# Patient Record
Sex: MALE | Race: WHITE | Employment: OTHER | URBAN - METROPOLITAN AREA
[De-identification: names, ages, dates, MRNs, and addresses within clinical notes are randomized per-mention and may not be internally consistent; named-entity substitution may affect disease eponyms.]

---

## 2017-12-01 ENCOUNTER — HOSPITAL ENCOUNTER (OUTPATIENT)
Dept: CT IMAGING | Age: 62
Discharge: HOME | End: 2017-12-01
Attending: INTERNAL MEDICINE
Payer: COMMERCIAL

## 2017-12-01 DIAGNOSIS — R10.32 LLQ PAIN: ICD-10-CM

## 2017-12-01 PROCEDURE — 74011636320 HC RX REV CODE- 636/320: Performed by: INTERNAL MEDICINE

## 2017-12-01 PROCEDURE — 74011000258 HC RX REV CODE- 258: Performed by: INTERNAL MEDICINE

## 2017-12-01 PROCEDURE — 74177 CT ABD & PELVIS W/CONTRAST: CPT

## 2017-12-01 RX ORDER — SODIUM CHLORIDE 0.9 % (FLUSH) 0.9 %
10 SYRINGE (ML) INJECTION
Status: COMPLETED | OUTPATIENT
Start: 2017-12-01 | End: 2017-12-01

## 2017-12-01 RX ADMIN — IOPAMIDOL 100 ML: 755 INJECTION, SOLUTION INTRAVENOUS at 09:09

## 2017-12-01 RX ADMIN — IOHEXOL 50 ML: 240 INJECTION, SOLUTION INTRATHECAL; INTRAVASCULAR; INTRAVENOUS; ORAL at 07:31

## 2017-12-01 RX ADMIN — Medication 10 ML: at 09:09

## 2017-12-01 RX ADMIN — SODIUM CHLORIDE 100 ML: 900 INJECTION, SOLUTION INTRAVENOUS at 09:09

## 2018-01-09 ENCOUNTER — HOSPITAL ENCOUNTER (INPATIENT)
Age: 63
LOS: 4 days | Discharge: HOME | DRG: 439 | End: 2018-01-13
Attending: EMERGENCY MEDICINE | Admitting: FAMILY MEDICINE
Payer: COMMERCIAL

## 2018-01-09 ENCOUNTER — APPOINTMENT (OUTPATIENT)
Dept: CT IMAGING | Age: 63
DRG: 439 | End: 2018-01-09
Attending: EMERGENCY MEDICINE
Payer: COMMERCIAL

## 2018-01-09 ENCOUNTER — APPOINTMENT (OUTPATIENT)
Dept: GENERAL RADIOLOGY | Age: 63
DRG: 439 | End: 2018-01-09
Attending: FAMILY MEDICINE
Payer: COMMERCIAL

## 2018-01-09 ENCOUNTER — APPOINTMENT (OUTPATIENT)
Dept: ULTRASOUND IMAGING | Age: 63
DRG: 439 | End: 2018-01-09
Attending: FAMILY MEDICINE
Payer: COMMERCIAL

## 2018-01-09 DIAGNOSIS — K85.80 OTHER ACUTE PANCREATITIS, UNSPECIFIED COMPLICATION STATUS: ICD-10-CM

## 2018-01-09 DIAGNOSIS — R10.13 ABDOMINAL PAIN, EPIGASTRIC: Primary | ICD-10-CM

## 2018-01-09 PROBLEM — K85.90 PANCREATITIS: Status: ACTIVE | Noted: 2018-01-09

## 2018-01-09 LAB
ALBUMIN SERPL-MCNC: 4.4 G/DL (ref 3.5–5)
ALBUMIN/GLOB SERPL: 1.1 {RATIO} (ref 1.1–2.2)
ALP SERPL-CCNC: 110 U/L (ref 45–117)
ALT SERPL-CCNC: 98 U/L (ref 12–78)
ANION GAP SERPL CALC-SCNC: 7 MMOL/L (ref 5–15)
AST SERPL-CCNC: 213 U/L (ref 15–37)
BASOPHILS # BLD: 0 K/UL (ref 0–0.1)
BASOPHILS NFR BLD: 0 % (ref 0–1)
BILIRUB SERPL-MCNC: 1.1 MG/DL (ref 0.2–1)
BUN SERPL-MCNC: 13 MG/DL (ref 6–20)
BUN/CREAT SERPL: 10 (ref 12–20)
CALCIUM SERPL-MCNC: 9.5 MG/DL (ref 8.5–10.1)
CHLORIDE SERPL-SCNC: 104 MMOL/L (ref 97–108)
CK SERPL-CCNC: 134 U/L (ref 39–308)
CO2 SERPL-SCNC: 29 MMOL/L (ref 21–32)
CREAT SERPL-MCNC: 1.25 MG/DL (ref 0.7–1.3)
DIFFERENTIAL METHOD BLD: ABNORMAL
EOSINOPHIL # BLD: 0 K/UL (ref 0–0.4)
EOSINOPHIL NFR BLD: 0 % (ref 0–7)
ERYTHROCYTE [DISTWIDTH] IN BLOOD BY AUTOMATED COUNT: 12.9 % (ref 11.5–14.5)
FLUAV AG NPH QL IA: NEGATIVE
FLUBV AG NOSE QL IA: NEGATIVE
GLOBULIN SER CALC-MCNC: 3.9 G/DL (ref 2–4)
GLUCOSE SERPL-MCNC: 116 MG/DL (ref 65–100)
HCT VFR BLD AUTO: 45.9 % (ref 36.6–50.3)
HGB BLD-MCNC: 15.6 G/DL (ref 12.1–17)
INR PPP: 1 (ref 0.9–1.1)
LACTATE SERPL-SCNC: 1.9 MMOL/L (ref 0.4–2)
LIPASE SERPL-CCNC: >3000 U/L (ref 73–393)
LYMPHOCYTES # BLD: 0.8 K/UL (ref 0.8–3.5)
LYMPHOCYTES NFR BLD: 7 % (ref 12–49)
MCH RBC QN AUTO: 31.2 PG (ref 26–34)
MCHC RBC AUTO-ENTMCNC: 34 G/DL (ref 30–36.5)
MCV RBC AUTO: 91.8 FL (ref 80–99)
MONOCYTES # BLD: 0.1 K/UL (ref 0–1)
MONOCYTES NFR BLD: 1 % (ref 5–13)
NEUTS SEG # BLD: 9.9 K/UL (ref 1.8–8)
NEUTS SEG NFR BLD: 92 % (ref 32–75)
PLATELET # BLD AUTO: 268 K/UL (ref 150–400)
POTASSIUM SERPL-SCNC: 4 MMOL/L (ref 3.5–5.1)
PROT SERPL-MCNC: 8.3 G/DL (ref 6.4–8.2)
PROTHROMBIN TIME: 10.6 SEC (ref 9–11.1)
RBC # BLD AUTO: 5 M/UL (ref 4.1–5.7)
RBC MORPH BLD: ABNORMAL
SODIUM SERPL-SCNC: 140 MMOL/L (ref 136–145)
WBC # BLD AUTO: 10.8 K/UL (ref 4.1–11.1)

## 2018-01-09 PROCEDURE — 74011000250 HC RX REV CODE- 250: Performed by: EMERGENCY MEDICINE

## 2018-01-09 PROCEDURE — 80053 COMPREHEN METABOLIC PANEL: CPT | Performed by: EMERGENCY MEDICINE

## 2018-01-09 PROCEDURE — 74011000258 HC RX REV CODE- 258: Performed by: EMERGENCY MEDICINE

## 2018-01-09 PROCEDURE — 76705 ECHO EXAM OF ABDOMEN: CPT

## 2018-01-09 PROCEDURE — 83605 ASSAY OF LACTIC ACID: CPT | Performed by: FAMILY MEDICINE

## 2018-01-09 PROCEDURE — 74011250636 HC RX REV CODE- 250/636: Performed by: FAMILY MEDICINE

## 2018-01-09 PROCEDURE — 87804 INFLUENZA ASSAY W/OPTIC: CPT | Performed by: EMERGENCY MEDICINE

## 2018-01-09 PROCEDURE — 85025 COMPLETE CBC W/AUTO DIFF WBC: CPT | Performed by: EMERGENCY MEDICINE

## 2018-01-09 PROCEDURE — 65660000000 HC RM CCU STEPDOWN

## 2018-01-09 PROCEDURE — 99283 EMERGENCY DEPT VISIT LOW MDM: CPT

## 2018-01-09 PROCEDURE — 96374 THER/PROPH/DIAG INJ IV PUSH: CPT

## 2018-01-09 PROCEDURE — 87186 SC STD MICRODIL/AGAR DIL: CPT | Performed by: FAMILY MEDICINE

## 2018-01-09 PROCEDURE — 96361 HYDRATE IV INFUSION ADD-ON: CPT

## 2018-01-09 PROCEDURE — 71045 X-RAY EXAM CHEST 1 VIEW: CPT

## 2018-01-09 PROCEDURE — 74011636320 HC RX REV CODE- 636/320: Performed by: EMERGENCY MEDICINE

## 2018-01-09 PROCEDURE — 36415 COLL VENOUS BLD VENIPUNCTURE: CPT | Performed by: EMERGENCY MEDICINE

## 2018-01-09 PROCEDURE — 83690 ASSAY OF LIPASE: CPT | Performed by: EMERGENCY MEDICINE

## 2018-01-09 PROCEDURE — 81001 URINALYSIS AUTO W/SCOPE: CPT | Performed by: FAMILY MEDICINE

## 2018-01-09 PROCEDURE — 82550 ASSAY OF CK (CPK): CPT | Performed by: FAMILY MEDICINE

## 2018-01-09 PROCEDURE — 87077 CULTURE AEROBIC IDENTIFY: CPT | Performed by: FAMILY MEDICINE

## 2018-01-09 PROCEDURE — 96375 TX/PRO/DX INJ NEW DRUG ADDON: CPT

## 2018-01-09 PROCEDURE — 85610 PROTHROMBIN TIME: CPT | Performed by: FAMILY MEDICINE

## 2018-01-09 PROCEDURE — 74011000250 HC RX REV CODE- 250: Performed by: FAMILY MEDICINE

## 2018-01-09 PROCEDURE — 74177 CT ABD & PELVIS W/CONTRAST: CPT

## 2018-01-09 PROCEDURE — 96376 TX/PRO/DX INJ SAME DRUG ADON: CPT

## 2018-01-09 PROCEDURE — 87040 BLOOD CULTURE FOR BACTERIA: CPT | Performed by: FAMILY MEDICINE

## 2018-01-09 PROCEDURE — 74011250636 HC RX REV CODE- 250/636: Performed by: EMERGENCY MEDICINE

## 2018-01-09 RX ORDER — SODIUM CHLORIDE 0.9 % (FLUSH) 0.9 %
10 SYRINGE (ML) INJECTION
Status: COMPLETED | OUTPATIENT
Start: 2018-01-09 | End: 2018-01-09

## 2018-01-09 RX ORDER — MORPHINE SULFATE 4 MG/ML
4 INJECTION, SOLUTION INTRAMUSCULAR; INTRAVENOUS
Status: COMPLETED | OUTPATIENT
Start: 2018-01-09 | End: 2018-01-09

## 2018-01-09 RX ORDER — HYDROMORPHONE HYDROCHLORIDE 2 MG/ML
1 INJECTION, SOLUTION INTRAMUSCULAR; INTRAVENOUS; SUBCUTANEOUS
Status: DISCONTINUED | OUTPATIENT
Start: 2018-01-09 | End: 2018-01-10

## 2018-01-09 RX ORDER — ONDANSETRON 2 MG/ML
4 INJECTION INTRAMUSCULAR; INTRAVENOUS
Status: DISCONTINUED | OUTPATIENT
Start: 2018-01-09 | End: 2018-01-13 | Stop reason: HOSPADM

## 2018-01-09 RX ORDER — SODIUM CHLORIDE 0.9 % (FLUSH) 0.9 %
5-10 SYRINGE (ML) INJECTION AS NEEDED
Status: DISCONTINUED | OUTPATIENT
Start: 2018-01-09 | End: 2018-01-13 | Stop reason: HOSPADM

## 2018-01-09 RX ORDER — SODIUM CHLORIDE 0.9 % (FLUSH) 0.9 %
5-10 SYRINGE (ML) INJECTION EVERY 8 HOURS
Status: DISCONTINUED | OUTPATIENT
Start: 2018-01-09 | End: 2018-01-13 | Stop reason: HOSPADM

## 2018-01-09 RX ORDER — MORPHINE SULFATE 2 MG/ML
4 INJECTION, SOLUTION INTRAMUSCULAR; INTRAVENOUS
Status: COMPLETED | OUTPATIENT
Start: 2018-01-09 | End: 2018-01-09

## 2018-01-09 RX ORDER — LEVOFLOXACIN 5 MG/ML
750 INJECTION, SOLUTION INTRAVENOUS EVERY 24 HOURS
Status: DISCONTINUED | OUTPATIENT
Start: 2018-01-09 | End: 2018-01-13 | Stop reason: HOSPADM

## 2018-01-09 RX ORDER — ONDANSETRON 2 MG/ML
4 INJECTION INTRAMUSCULAR; INTRAVENOUS
Status: COMPLETED | OUTPATIENT
Start: 2018-01-09 | End: 2018-01-09

## 2018-01-09 RX ORDER — FAMOTIDINE 10 MG/ML
20 INJECTION INTRAVENOUS
Status: COMPLETED | OUTPATIENT
Start: 2018-01-09 | End: 2018-01-09

## 2018-01-09 RX ORDER — PANTOPRAZOLE SODIUM 20 MG/1
TABLET, DELAYED RELEASE ORAL DAILY
COMMUNITY

## 2018-01-09 RX ORDER — METRONIDAZOLE 500 MG/100ML
500 INJECTION, SOLUTION INTRAVENOUS EVERY 6 HOURS
Status: DISCONTINUED | OUTPATIENT
Start: 2018-01-09 | End: 2018-01-11

## 2018-01-09 RX ORDER — SODIUM CHLORIDE AND POTASSIUM CHLORIDE .9; .15 G/100ML; G/100ML
SOLUTION INTRAVENOUS CONTINUOUS
Status: DISPENSED | OUTPATIENT
Start: 2018-01-09 | End: 2018-01-10

## 2018-01-09 RX ORDER — FAMOTIDINE 10 MG/1
TABLET ORAL 2 TIMES DAILY
COMMUNITY
End: 2018-01-23

## 2018-01-09 RX ORDER — HYDROMORPHONE HYDROCHLORIDE 2 MG/ML
2 INJECTION, SOLUTION INTRAMUSCULAR; INTRAVENOUS; SUBCUTANEOUS ONCE
Status: COMPLETED | OUTPATIENT
Start: 2018-01-09 | End: 2018-01-09

## 2018-01-09 RX ORDER — LORAZEPAM 2 MG/ML
1 INJECTION INTRAMUSCULAR
Status: DISCONTINUED | OUTPATIENT
Start: 2018-01-09 | End: 2018-01-13 | Stop reason: HOSPADM

## 2018-01-09 RX ORDER — SODIUM CHLORIDE, SODIUM LACTATE, POTASSIUM CHLORIDE, CALCIUM CHLORIDE 600; 310; 30; 20 MG/100ML; MG/100ML; MG/100ML; MG/100ML
200 INJECTION, SOLUTION INTRAVENOUS CONTINUOUS
Status: DISCONTINUED | OUTPATIENT
Start: 2018-01-09 | End: 2018-01-10

## 2018-01-09 RX ADMIN — ONDANSETRON 4 MG: 2 INJECTION INTRAMUSCULAR; INTRAVENOUS at 17:54

## 2018-01-09 RX ADMIN — MORPHINE SULFATE 4 MG: 2 INJECTION, SOLUTION INTRAMUSCULAR; INTRAVENOUS at 16:20

## 2018-01-09 RX ADMIN — MORPHINE SULFATE 4 MG: 4 INJECTION, SOLUTION INTRAMUSCULAR; INTRAVENOUS at 17:38

## 2018-01-09 RX ADMIN — SODIUM CHLORIDE 500 ML: 900 INJECTION, SOLUTION INTRAVENOUS at 19:45

## 2018-01-09 RX ADMIN — SODIUM CHLORIDE AND POTASSIUM CHLORIDE: 9; 1.49 INJECTION, SOLUTION INTRAVENOUS at 18:46

## 2018-01-09 RX ADMIN — SODIUM CHLORIDE 100 ML: 900 INJECTION, SOLUTION INTRAVENOUS at 17:05

## 2018-01-09 RX ADMIN — METRONIDAZOLE 500 MG: 500 INJECTION, SOLUTION INTRAVENOUS at 23:44

## 2018-01-09 RX ADMIN — HYDROMORPHONE HYDROCHLORIDE 2 MG: 2 INJECTION, SOLUTION INTRAMUSCULAR; INTRAVENOUS; SUBCUTANEOUS at 18:30

## 2018-01-09 RX ADMIN — SODIUM CHLORIDE, SODIUM LACTATE, POTASSIUM CHLORIDE, AND CALCIUM CHLORIDE 200 ML: 600; 310; 30; 20 INJECTION, SOLUTION INTRAVENOUS at 23:43

## 2018-01-09 RX ADMIN — Medication 10 ML: at 17:05

## 2018-01-09 RX ADMIN — FAMOTIDINE 20 MG: 10 INJECTION, SOLUTION INTRAVENOUS at 16:20

## 2018-01-09 RX ADMIN — IOPAMIDOL 100 ML: 755 INJECTION, SOLUTION INTRAVENOUS at 17:05

## 2018-01-09 RX ADMIN — FAMOTIDINE 20 MG: 10 INJECTION, SOLUTION INTRAVENOUS at 21:21

## 2018-01-09 NOTE — ED PROVIDER NOTES
HPI Comments: 57 y.o. male with past medical history significant for prostate cancer, GERD, and diverticulitis who presents from home with chief complaint of worsening epigastric abdominal pain since this morning. Pt states pain feels similar to previous diverticulitis and is accompanied by chills. His gastroenterologist, Dr. Kahlil Stauffer, recommended he come to the ED for further evaluation. Pt denies abdominal surgery Hx. He had CT Abd Pelv W Cont with no acute abnormality on 12/1/2017. There are no other acute medical concerns at this time. Social hx: current some day smoker, reports alcohol use, denies drug use  PCP: Veronica Salvador MD    Note written by Julián Marie. Josesito Chandler, as dictated by Tung Ybarra MD 4:09 PM      The history is provided by the patient. No  was used. Past Medical History:   Diagnosis Date    Prostate CA [facility])        Past Surgical History:   Procedure Laterality Date    HX PROSTATECTOMY           History reviewed. No pertinent family history. Social History     Social History    Marital status:      Spouse name: N/A    Number of children: N/A    Years of education: N/A     Occupational History    Not on file. Social History Main Topics    Smoking status: Current Some Day Smoker    Smokeless tobacco: Never Used    Alcohol use Yes    Drug use: No    Sexual activity: Not on file     Other Topics Concern    Not on file     Social History Narrative         ALLERGIES: Review of patient's allergies indicates no known allergies. Review of Systems   Constitutional: Positive for chills. Negative for fever. HENT: Negative for ear pain and sore throat. Eyes: Negative for photophobia and pain. Respiratory: Negative for chest tightness and shortness of breath. Cardiovascular: Negative for chest pain and leg swelling. Gastrointestinal: Positive for abdominal pain. Negative for nausea and vomiting.    Genitourinary: Negative for dysuria and flank pain. Musculoskeletal: Negative for back pain and neck pain. Skin: Negative for rash and wound. Neurological: Negative for dizziness, light-headedness and headaches. Vitals:    01/09/18 1419 01/09/18 1542   BP: (!) 160/95 171/90   Pulse: (!) 55 64   Resp: 16 16   Temp: 97.7 °F (36.5 °C)    SpO2: 99% 100%   Weight: 92.8 kg (204 lb 9.6 oz)    Height: 6' (1.829 m)             Physical Exam   Constitutional: He is oriented to person, place, and time. He appears well-developed and well-nourished. He appears distressed. HENT:   Head: Normocephalic and atraumatic. Mouth/Throat: Oropharynx is clear and moist.   Eyes: Conjunctivae and EOM are normal. Pupils are equal, round, and reactive to light. Neck: Normal range of motion. Cardiovascular: Normal rate, regular rhythm, normal heart sounds and intact distal pulses. No murmur heard. Pulmonary/Chest: Effort normal and breath sounds normal. No stridor. No respiratory distress. Abdominal: Soft. Bowel sounds are normal. There is tenderness in the epigastric area and periumbilical area. There is guarding. There is no rebound and no CVA tenderness. Musculoskeletal: Normal range of motion. He exhibits no edema or tenderness. Neurological: He is alert and oriented to person, place, and time. No cranial nerve deficit. Skin: Skin is warm and dry. He is not diaphoretic. Psychiatric: He has a normal mood and affect. Nursing note and vitals reviewed. MDM  Number of Diagnoses or Management Options  Abdominal pain, epigastric:   Other acute pancreatitis, unspecified complication status:   Diagnosis management comments: Patient with upper abdominal pain - gerd, SBO, pancreatitis, diverticular disease, gastritis    Check labs and get CT scan of abdomen - IVF and meds for symptoms and re-eval.    1800 - patient with pancreatitis on Ct, will admit and treat pain.   No RUQ pain on exam       Amount and/or Complexity of Data Reviewed  Clinical lab tests: reviewed and ordered  Tests in the radiology section of CPT®: reviewed and ordered  Discuss the patient with other providers: yes    Risk of Complications, Morbidity, and/or Mortality  Presenting problems: high  Diagnostic procedures: high  Management options: high      ED Course       Procedures    PROGRESS NOTE:  5:55 PM Discussed CT report with radiologist.      CONSULT NOTE:  6:08 PM Fidencio Rendon MD spoke with Dr. Nas Vidal, Consult for Hospitalist.  Discussed available diagnostic tests and clinical findings. He is in agreement with care plans as outlined. Dr. Nas Vidal will evaluate and admit patient.

## 2018-01-09 NOTE — IP AVS SNAPSHOT
[address] [place] [address] 
[phone] Patient: Jalil Maldonado MRN: [id number] OYK:1/[birth year]328 You are allergic to the following No active allergies Recent Documentation Height Weight BMI Smoking Status 1.829 m 90.8 kg 27.15 kg/m2 Current Some Day Smoker Unresulted Labs-Please follow up with your PCP about these lab tests Order Current Status CULTURE, BLOOD Preliminary result Emergency Contacts  (Rel.) Home Phone Work Phone Mobile Phone Kami Weeks [phone] -- [phone] About your hospitalization You were admitted on:  January 9, 2018 You last received care in the:  [facility] You were discharged on:  January 13, 2018 Why you were hospitalized Your primary diagnosis was:  Pancreatitis Providers Seen During Your Hospitalization Provider Specialty Primary office phone Levy Peter MD Emergency Medicine [phone] Sharan Sanabria MD Hospitalist [phone] Gokul Sacnhez MD Internal Medicine [phone] Your Primary Care Physician (PCP) Primary Care Physician Office Phone Office Fax Allison Norton [phone] Follow-up Information Follow up With Details Comments Contact Info Ale Garcia MD  As needed 612 [facility] [address] [facility] 7 [address] 
[phone] Reed Almazan MD  call to make appointment 200 [facility]  [address] [place] [address] 
[phone] Skip Cisneros MD In 4 weeks follow up after antibiotics completion and cholecystectomy 200 [facility] [address] [facility] [address] 
[phone] My Medications TAKE these medications as instructed Instructions Each Dose to Equal  
 Morning Noon Evening Bedtime  
 famotidine 10 mg tablet Commonly known as:  PEPCID Your last dose was: Your next dose is: Take  by mouth two (2) times a day. GAVISCON  mg/15 mL suspension Generic drug:  aluminum hydrox-magnesium carb Your last dose was: Your next dose is: Take 15 mL by mouth as needed for Indigestion. 15 mL  
    
   
   
   
  
 L. acidoph & paracasei- S therm- Bifido 8 billion cell Cap cap Commonly known as:  SHIRA-Q/RISAQUAD Start taking on:  1/14/2018 Your last dose was: Your next dose is: Take 1 Cap by mouth daily. 1 Cap  
    
   
   
   
  
 levoFLOXacin 750 mg tablet Commonly known as:  Angelia Stalling Your last dose was: Your next dose is: Take 1 Tab by mouth daily. 750 mg  
    
   
   
   
  
 pantoprazole 20 mg tablet Commonly known as:  PROTONIX Your last dose was: Your next dose is: Take  by mouth daily. Where to Get Your Medications Information on where to get these meds will be given to you by the nurse or doctor. ! Ask your nurse or doctor about these medications L. acidoph & paracasei- S therm- Bifido 8 billion cell Cap cap  
 levoFLOXacin 750 mg tablet Discharge Instructions Discharge Instructions PATIENT ID: Caleb Aguilar MRN: [id number] YOB: 1955 DATE OF ADMISSION: 1/9/2018  4:11 PM   
DATE OF DISCHARGE: 1/13/2018 PRIMARY CARE PROVIDER: Donis Irby MD  
ATTENDING PHYSICIAN: Catia Mccall MD 
DISCHARGING PROVIDER: Sharla Hadley NP. To contact this individual call [phone] and ask the  to page. If unavailable ask to be transferred the Adult Hospitalist Department. DISCHARGE DIAGNOSES Pancreatitis Gallstones with normal common bile duct Bacteremia CONSULTATIONS: IP CONSULT TO HOSPITALIST 
IP CONSULT TO GASTROENTEROLOGY IP CONSULT TO GENERAL SURGERY 
 
PROCEDURES/SURGERIES: Procedure(s): ENDOSCOPIC ULTRASOUND (EUS) ENDOSCOPIC RETROGRADE CHOLANGIOPANCREATOGRAPHY 
ESOPHAGOGASTRODUODENOSCOPY (EGD) FOLLOW UP APPOINTMENTS:  
Follow-up Information Follow up With Details Comments Contact Info Harriet Vang MD  As needed 612 [facility] [address] [place] [address] 
[phone] Ritika Paez MD  call to make appointment [address] [facility] [address]  [address] 
[phone] Skip Villagomez MD In 4 weeks follow up after antibiotics completion and cholecystectomy [address] [facility] [facility] [address] [facility] [address] [phone] ADDITIONAL CARE RECOMMENDATIONS:  
Take full course of antibiotics (have 10 days remaining, starting this evening 1/13) DIET: Low fat, Low cholesterol ACTIVITY: Activity as tolerated · It is important that you take the medication exactly as they are prescribed. · Keep your medication in the bottles provided by the pharmacist and keep a list of the medication names, dosages, and times to be taken in your wallet. · Do not take other medications without consulting your doctor. NOTIFY YOUR PHYSICIAN FOR ANY OF THE FOLLOWING:  
Fever over 101 degrees for 24 hours. Chest pain, shortness of breath, fever, chills, nausea, vomiting, diarrhea, change in mentation, falling, weakness, bleeding. Severe pain or pain not relieved by medications. Or, any other signs or symptoms that you may have questions about. DISPOSITION: 
  Home With: 
 OT  PT  [facility]  RN  
  
 SNF/Inpatient Rehab/LTAC Independent/assisted living Hospice  
xx Other: Home CDMP Checked:  
Yes *x* PROBLEM LIST Updated: 
Yes *x* Signed:  
Arie Fuller NP 
1/13/2018 10:51 AM 
 
 Probiotic (Acidophilus Probiotic Blend, Culturelle, Adult Probiotic, Bifidonate) - (By mouth) Why this medicine is used: May increase the number of healthy bacteria in your stomach and intestines. Common side effects: · Mild diarrhea, constipation, nausea, vomiting · Mild gas or cramps © 2017 [facility] Information is for End User's use only and may not be sold, redistributed or otherwise used for commercial purposes. Levofloxacin (Levaquin, Levaquin Leva-merrill) - (By mouth) Why this medicine is used:  
Treats infections. Contact a nurse or doctor right away if you have: · Blistering, peeling, red skin rash · Fast, slow, or uneven heartbeat; lightheadedness or fainting · Dark urine or pale stools, loss of appetite, stomach pain, yellow skin or eyes · Severe or bloody diarrhea · Pain, stiffness, swelling, or bruises around your ankle, leg, shoulder, or other joint Common side effects: · Mild nausea, vomiting, diarrhea · Mild headache © 2017 [facility] Information is for End User's use only and may not be sold, redistributed or otherwise used for commercial purposes. Discharge Orders None [employer] Announcement We are excited to announce that we are making your provider's discharge notes available to you in LetMeHearYa. You will see these notes when they are completed and signed by the physician that discharged you from your recent hospital stay. If you have any questions or concerns about any information you see in LetMeHearYa, please call the Health Information Department where you were seen or reach out to your Primary Care Provider for more information about your plan of care. Introducing [facility]! [facility] introduces LetMeHearYa patient portal. Now you can access parts of your medical record, email your doctor's office, and request medication refills online. 1. In your internet browser, go to [email or web address] 2. Click on the First Time User? Click Here link in the Sign In box. You will see the New Member Sign Up page. 3. Enter your XAware Access Code exactly as it appears below. You will not need to use this code after youve completed the sign-up process. If you do not sign up before the expiration date, you must request a new code. · XAware Access Code: [email or web address] Expires: 2/11/2018  5:18 PM 
 
4. Enter the last four digits of your Social Security Number (xxxx) and Date of Birth (mm/dd/yyyy) as indicated and click Submit. You will be taken to the next sign-up page. 5. Create a XAware ID. This will be your XAware login ID and cannot be changed, so think of one that is secure and easy to remember. 6. Create a XAware password. You can change your password at any time. 7. Enter your Password Reset Question and Answer. This can be used at a later time if you forget your password. 8. Enter your e-mail address. You will receive e-mail notification when new information is available in [address] 9. Click Sign Up. You can now view and download portions of your medical record. 10. Click the Download Summary menu link to download a portable copy of your medical information. If you have questions, please visit the Frequently Asked Questions section of the XAware website. Remember, XAware is NOT to be used for urgent needs. For medical emergencies, dial 911. Now available from your iPhone and Android! General Information Please provide this summary of care documentation to your next provider. Patient Signature:  ____________________________________________________________ Date:  ____________________________________________________________  
  
Flower Gómez Provider Signature:  ____________________________________________________________ Date:  ____________________________________________________________

## 2018-01-09 NOTE — PROGRESS NOTES
Admission Medication Reconciliation:    Information obtained from: pt and pt's visitor    Significant PMH/Disease States:   Past Medical History:   Diagnosis Date    Prostate CA [facility])        Chief Complaint for this Admission:    Chief Complaint   Patient presents with    Epigastric Pain    Abdominal Pain         Allergies:  Review of patient's allergies indicates no known allergies. Prior to Admission Medications:   Prior to Admission Medications   Prescriptions Last Dose Informant Patient Reported? Taking?   aluminum hydrox-magnesium carb (GAVISCON)  mg/15 mL suspension 1/2/2018 at Unknown time  Yes Yes   Sig: Take 15 mL by mouth as needed for Indigestion. famotidine (PEPCID) 10 mg tablet 1/2/2018 at Unknown time  Yes Yes   Sig: Take  by mouth two (2) times a day. pantoprazole (PROTONIX) 20 mg tablet 1/2/2018 at Unknown time  Yes Yes   Sig: Take  by mouth daily. Facility-Administered Medications: None         Comments/Recommendations:   Spoke to patient about his medications and allergies and attempted to update med list. Pt was a poor historian and could not recall strength and dosages of his medications and stated that he only takes them \"occasionally\". Medications removed: Norco 5-325 tabs    Medications added: Pantoprazole 20mg tablets 1 po daily PRN (pt unsure if this is the true strength), Famotidine 10mg tablets 1 po daily PRN (Pt unsure if this is the true strength), and Gaviscon suspension PRN (pt states that he drinks \"a few gulps\"). No other medication changes.       Shiloh Callaway, PharmD Candidate [id number]

## 2018-01-09 NOTE — ED TRIAGE NOTES
Pt arrives with abd pain and epigastric pain that is coming & going since this morning. Dr. Chanda Sauer has seen pt & had recent CT scan of abd. Hx of diverticulitis.

## 2018-01-10 ENCOUNTER — APPOINTMENT (OUTPATIENT)
Dept: MRI IMAGING | Age: 63
DRG: 439 | End: 2018-01-10
Attending: NURSE PRACTITIONER
Payer: COMMERCIAL

## 2018-01-10 LAB
APPEARANCE UR: CLEAR
BACTERIA URNS QL MICRO: NEGATIVE /HPF
BASOPHILS # BLD: 0 K/UL (ref 0–0.1)
BASOPHILS NFR BLD: 0 % (ref 0–1)
BILIRUB UR QL CFM: NEGATIVE
CHOLEST SERPL-MCNC: 152 MG/DL
CK SERPL-CCNC: 116 U/L (ref 39–308)
COLOR UR: NORMAL
EOSINOPHIL # BLD: 0 K/UL (ref 0–0.4)
EOSINOPHIL NFR BLD: 0 % (ref 0–7)
EPITH CASTS URNS QL MICRO: NORMAL /LPF
ERYTHROCYTE [DISTWIDTH] IN BLOOD BY AUTOMATED COUNT: 13.4 % (ref 11.5–14.5)
GLUCOSE UR STRIP.AUTO-MCNC: NEGATIVE MG/DL
HCT VFR BLD AUTO: 39.4 % (ref 36.6–50.3)
HDLC SERPL-MCNC: 68 MG/DL
HDLC SERPL: 2.2 {RATIO} (ref 0–5)
HGB BLD-MCNC: 13 G/DL (ref 12.1–17)
HGB UR QL STRIP: NEGATIVE
HYALINE CASTS URNS QL MICRO: NORMAL /LPF (ref 0–5)
KETONES UR QL STRIP.AUTO: NEGATIVE MG/DL
LDLC SERPL CALC-MCNC: 71.6 MG/DL (ref 0–100)
LEUKOCYTE ESTERASE UR QL STRIP.AUTO: NEGATIVE
LIPASE SERPL-CCNC: >3000 U/L (ref 73–393)
LIPID PROFILE,FLP: NORMAL
LYMPHOCYTES # BLD: 0.4 K/UL (ref 0.8–3.5)
LYMPHOCYTES NFR BLD: 2 % (ref 12–49)
MAGNESIUM SERPL-MCNC: 1.3 MG/DL (ref 1.6–2.4)
MCH RBC QN AUTO: 30.7 PG (ref 26–34)
MCHC RBC AUTO-ENTMCNC: 33 G/DL (ref 30–36.5)
MCV RBC AUTO: 93.1 FL (ref 80–99)
MONOCYTES # BLD: 1.3 K/UL (ref 0–1)
MONOCYTES NFR BLD: 7 % (ref 5–13)
NEUTS SEG # BLD: 17.2 K/UL (ref 1.8–8)
NEUTS SEG NFR BLD: 91 % (ref 32–75)
NITRITE UR QL STRIP.AUTO: NEGATIVE
PH UR STRIP: 5 [PH] (ref 5–8)
PHOSPHATE SERPL-MCNC: 2.9 MG/DL (ref 2.6–4.7)
PLATELET # BLD AUTO: 188 K/UL (ref 150–400)
PROT UR STRIP-MCNC: NEGATIVE MG/DL
RBC # BLD AUTO: 4.23 M/UL (ref 4.1–5.7)
RBC #/AREA URNS HPF: NORMAL /HPF (ref 0–5)
SP GR UR REFRACTOMETRY: 1.02 (ref 1–1.03)
TRIGL SERPL-MCNC: 62 MG/DL (ref ?–150)
UROBILINOGEN UR QL STRIP.AUTO: 0.2 EU/DL (ref 0.2–1)
VLDLC SERPL CALC-MCNC: 12.4 MG/DL
WBC # BLD AUTO: 18.9 K/UL (ref 4.1–11.1)
WBC URNS QL MICRO: NORMAL /HPF (ref 0–4)

## 2018-01-10 PROCEDURE — 77030021566 MRI ABD W MRCP W WO CONT

## 2018-01-10 PROCEDURE — 80061 LIPID PANEL: CPT | Performed by: FAMILY MEDICINE

## 2018-01-10 PROCEDURE — 82550 ASSAY OF CK (CPK): CPT | Performed by: FAMILY MEDICINE

## 2018-01-10 PROCEDURE — 74011250636 HC RX REV CODE- 250/636: Performed by: NURSE PRACTITIONER

## 2018-01-10 PROCEDURE — 74011000258 HC RX REV CODE- 258: Performed by: HOSPITALIST

## 2018-01-10 PROCEDURE — 83735 ASSAY OF MAGNESIUM: CPT | Performed by: FAMILY MEDICINE

## 2018-01-10 PROCEDURE — 65270000029 HC RM PRIVATE

## 2018-01-10 PROCEDURE — A9577 INJ MULTIHANCE: HCPCS | Performed by: HOSPITALIST

## 2018-01-10 PROCEDURE — 36415 COLL VENOUS BLD VENIPUNCTURE: CPT | Performed by: FAMILY MEDICINE

## 2018-01-10 PROCEDURE — 74011250636 HC RX REV CODE- 250/636: Performed by: FAMILY MEDICINE

## 2018-01-10 PROCEDURE — 83690 ASSAY OF LIPASE: CPT | Performed by: HOSPITALIST

## 2018-01-10 PROCEDURE — 74011000250 HC RX REV CODE- 250: Performed by: FAMILY MEDICINE

## 2018-01-10 PROCEDURE — 85025 COMPLETE CBC W/AUTO DIFF WBC: CPT | Performed by: FAMILY MEDICINE

## 2018-01-10 PROCEDURE — 87040 BLOOD CULTURE FOR BACTERIA: CPT | Performed by: NURSE PRACTITIONER

## 2018-01-10 PROCEDURE — 74011250636 HC RX REV CODE- 250/636: Performed by: HOSPITALIST

## 2018-01-10 PROCEDURE — 84100 ASSAY OF PHOSPHORUS: CPT | Performed by: FAMILY MEDICINE

## 2018-01-10 RX ORDER — MORPHINE SULFATE 2 MG/ML
1 INJECTION, SOLUTION INTRAMUSCULAR; INTRAVENOUS
Status: DISCONTINUED | OUTPATIENT
Start: 2018-01-10 | End: 2018-01-10

## 2018-01-10 RX ORDER — MAGNESIUM SULFATE HEPTAHYDRATE 40 MG/ML
2 INJECTION, SOLUTION INTRAVENOUS ONCE
Status: COMPLETED | OUTPATIENT
Start: 2018-01-10 | End: 2018-01-10

## 2018-01-10 RX ORDER — SODIUM CHLORIDE, SODIUM LACTATE, POTASSIUM CHLORIDE, CALCIUM CHLORIDE 600; 310; 30; 20 MG/100ML; MG/100ML; MG/100ML; MG/100ML
50 INJECTION, SOLUTION INTRAVENOUS CONTINUOUS
Status: DISCONTINUED | OUTPATIENT
Start: 2018-01-10 | End: 2018-01-13

## 2018-01-10 RX ORDER — HYDROMORPHONE HYDROCHLORIDE 2 MG/ML
0.5 INJECTION, SOLUTION INTRAMUSCULAR; INTRAVENOUS; SUBCUTANEOUS
Status: DISCONTINUED | OUTPATIENT
Start: 2018-01-10 | End: 2018-01-13 | Stop reason: HOSPADM

## 2018-01-10 RX ADMIN — FOLIC ACID: 5 INJECTION, SOLUTION INTRAMUSCULAR; INTRAVENOUS; SUBCUTANEOUS at 08:51

## 2018-01-10 RX ADMIN — HYDROMORPHONE HYDROCHLORIDE 0.5 MG: 2 INJECTION, SOLUTION INTRAMUSCULAR; INTRAVENOUS; SUBCUTANEOUS at 14:14

## 2018-01-10 RX ADMIN — SODIUM CHLORIDE, SODIUM LACTATE, POTASSIUM CHLORIDE, AND CALCIUM CHLORIDE 150 ML/HR: 600; 310; 30; 20 INJECTION, SOLUTION INTRAVENOUS at 11:49

## 2018-01-10 RX ADMIN — FAMOTIDINE 20 MG: 10 INJECTION, SOLUTION INTRAVENOUS at 08:52

## 2018-01-10 RX ADMIN — LEVOFLOXACIN 750 MG: 5 INJECTION, SOLUTION INTRAVENOUS at 00:48

## 2018-01-10 RX ADMIN — HYDROMORPHONE HYDROCHLORIDE 0.5 MG: 2 INJECTION, SOLUTION INTRAMUSCULAR; INTRAVENOUS; SUBCUTANEOUS at 22:07

## 2018-01-10 RX ADMIN — Medication 10 ML: at 21:06

## 2018-01-10 RX ADMIN — GADOBENATE DIMEGLUMINE 19 ML: 529 INJECTION, SOLUTION INTRAVENOUS at 20:00

## 2018-01-10 RX ADMIN — MAGNESIUM SULFATE HEPTAHYDRATE 2 G: 40 INJECTION, SOLUTION INTRAVENOUS at 09:05

## 2018-01-10 RX ADMIN — LEVOFLOXACIN 750 MG: 5 INJECTION, SOLUTION INTRAVENOUS at 22:10

## 2018-01-10 RX ADMIN — SODIUM CHLORIDE 40 ML: 900 INJECTION, SOLUTION INTRAVENOUS at 20:00

## 2018-01-10 RX ADMIN — FAMOTIDINE 20 MG: 10 INJECTION, SOLUTION INTRAVENOUS at 21:16

## 2018-01-10 RX ADMIN — Medication 10 ML: at 14:18

## 2018-01-10 RX ADMIN — METRONIDAZOLE 500 MG: 500 INJECTION, SOLUTION INTRAVENOUS at 20:06

## 2018-01-10 RX ADMIN — METRONIDAZOLE 500 MG: 500 INJECTION, SOLUTION INTRAVENOUS at 11:48

## 2018-01-10 RX ADMIN — METRONIDAZOLE 500 MG: 500 INJECTION, SOLUTION INTRAVENOUS at 04:51

## 2018-01-10 RX ADMIN — METRONIDAZOLE 500 MG: 500 INJECTION, SOLUTION INTRAVENOUS at 23:53

## 2018-01-10 NOTE — H&P
[address]  ACUTE CARE HISTORY AND PHYSICAL    Iain Silva  MR#: [id number]  : 1955  ACCOUNT #: [de-identified]   DATE OF SERVICE: 2018    CHIEF COMPLAINT:  Abdominal pain. HISTORY OF PRESENT ILLNESS:  The patient is a 67-year-old gentleman with past medical history of prostate cancer status post prostatectomy, history of GERD, diverticulitis who presents to the hospital with the abovementioned complaints. The patient reports that he has been having some abdominal pain on and off for about a year. Reports that he has basically been attributing it to acid reflux, has been taking medication for the same, but in the last two to three days, he started experiencing significant abdominal pain. The patient reports that he went to his gastroenterologist, Dr. Conchita Estevez office this morning, had some blood work done and was told to take Zantac, but continued to have exceptional pain. Called his office again and was told to come to the ER. The patient was found to have an acute pancreatitis in the ER and was requested to be admitted under the hospitalist service. The patient reports he has never had pancreatitis before. Denies any gallbladder issues or liver issues in the past.  His wife does report that he drinks three beers on a daily basis and on weekends probably add three glasses of wine to that. He did not use to drink in the past, but has recently started drinking this much since he has retired. The patient denies any recent med or new medications that he started. Denies any diarrhea and nausea, but does admit to some nausea associated with the symptoms. Denies any vomiting. The patient does report that he was having significant chills today associated with his symptoms, which have subsided today. The patient denies any other complaints or problems.   Denies any headache, blurry vision, sore throat, trouble swallowing, trouble with speech, any chest pain, shortness of breath, cough, fevers, lightheadedness, dizziness, any constipation or diarrhea, urinary symptoms, focal or generalized neurological weakness, recent travel, sick contacts, falls, injuries, hematemesis, melena, hemoptysis or any other concerns or problems. PAST MEDICAL HISTORY:  History of fall. HOME MEDICATIONS:  Currently, the patient is on pantoprazole 20 mg daily, famotidine 10 mg b.i.d. and Gaviscon 15 mL by mouth as needed for indigestion. REVIEW OF SYSTEMS:  All systems were reviewed and found to be essentially negative except for the symptoms mentioned above. ALLERGIES:  NO KNOWN DRUG ALLERGIES. FAMILY HISTORY:  Discussed, was found to be noncontributory to his admission. SOCIAL HISTORY:  Current everyday smoker, drinks three beers on a daily basis with additional three to four glasses of wine on weekends. Denies IV drug abuse. Lives at home. PHYSICAL EXAMINATION:  VITAL SIGNS:  Temperature 98.9, pulse 80, respiratory rate 14, blood pressure 95/65, pulse ox 100% on room air. GENERAL:  Alert x3, awake, in no acute distress, resting in bed, pleasant male, appears to be stated age. HEENT:  Pupils equal, reactive to light. Dry mucous membranes. Tympanic membranes are clear. NECK:  Supple. CHEST:  Clear to auscultation bilaterally. CORONARY:  S1, S2 heard. ABDOMEN:  Soft, tender to palpation in the epigastric region. No rebound or guarding. Bowel sounds were hyperactive. EXTREMITIES:  No clubbing, no cyanosis, no edema. NEUROPSYCHIATRY:  Pleasant mood and affect. Cranial nerves II-XII grossly intact. Sensory grossly within normal limits. DTR 2+/4. Strength by5/5. SKIN:  Warm. LABORATORY DATA:  White count 10.8, hemoglobin 15.6, hematocrit 45.9, platelets 878. Sodium 140, potassium 4.0, chloride 104, bicarb 29, anion gap 7, glucose 116, BUN 15, creatinine 1.25, calcium 9.5, bilirubin total 1.1, ALT 98, , alkaline phosphatase 110.   Lipase greater than 3000.      Influenza A and B are negative. CT of the abdomen and pelvis shows edema at the head of the pancreas with surrounding inflammatory stranding extending into the pancreatic groove. A  duodenal diverticulum is again seen from the third portion of the duodenum. This appears less distended from the previous seen and slightly irregular duodenum. There is no wall thickening in the diverticulum of the adjacent duodenum. Finding most consistently related to acute pancreatitis than diverticulitis, status post prostatectomy. ASSESSMENT AND PLAN:    1. Acute pancreatitis. The patient will be admitted to the hospital.  We will start the patient on aggressive IV hydration, pain control. Keep n.p.o. for now. GI consult has been requested. May consider getting a surgical consult in the morning secondary to pancreatic diverticulum. We will optimize pain control and continue to monitor. The etiology is unclear at this point of time. Could be alcoholic, most likely alcoholic pancreatitis that may be gallbladder disease that is present, although CT did not show any acute abnormalities. The patient does have elevated transaminitis associated with mildly elevated bilirubin. We will get a right upper quadrant ultrasound and further intervention will be per hospital course. Reassess as needed. We will continue to closely monitor. 2.  Transaminitis and mild hyperbilirubinemia, unclear etiology. Right upper quadrant ultrasound has been ordered. CT of the belly does not show any acute pathology. Provide pain control. We will continue to closely monitor. Reassess as needed. Refrain from using Tylenol and await GI input. 3.  History of alcohol abuse. The patient will be on CIWA protocol. Continue to closely monitor. Reassess as needed. We will provide multivitamin, thiamine and folic acid. We will provide Ativan p.r.n.  4.  History of prostate cancer. Monitor. 5.  History of acid reflux.   The patient will be on Pepcid IV. 6.  Gastrointestinal and deep venous thrombosis prophylaxis. The patient is on SCDs.       Ale Stark MD MM / Angelia Humphrey  D: 01/09/2018 20:17     T: 01/09/2018 21:30  JOB #: [id number]

## 2018-01-10 NOTE — PROGRESS NOTES
Received call from Angel Mckenzie in the lab that the blodo culture results show gram - rods in 2 bottles from different sites. Paged Warren Sagastume NP to update.

## 2018-01-10 NOTE — PROGRESS NOTES
Hospitalist Progress Note  Lillian Waldron NP  Answering service: [phone] from in house phone  Cell: [phone]      Date of Service:  1/10/2018  NAME:  Ismael Lord  :  1955  MRN:  [id number]    Admission Summary:   Pt was referred to the ED due to unrelenting abdominal pain. The patient reports indicated he has been having some abdominal pain on/off for about a year. Has been attributing it to acid reflux, but in the last 2-3 days, he started experiencing significant abdominal pain. He saw Dr. Josemanuel Novoa in the office the day of admit  this morning, had some blood work done and was told to take Zantac, but continued to have exceptional pain. When his called the office again and was told to come to the ER. The patient was found to have an acute pancreatitis in the ED Denies ever having pancreatitis, gallbladder issues or liver issues in the past.  His wife reports that he drinks three beers on a daily basis and on weekends probably adds three glasses of wine to that. Admits to some nausea but denies any vomiting. The pts reported chills the am of admit. Interval history / Subjective:   Pt sitting on side of bed, wife at bedside. He reports to be feeling much better today and pain level is ~ 2/10 on pain scale. Dilaudid has been more effective than morphine with pain control. Assessment & Plan:     Acute pancreatitis:    - IV hydration, pain control.   - NPO  - CT abdomen:  edema in the head of the pancreas with surrounding inflammatory stranding extending into the pancreatic groove. A duodenal diverticulum is again seen from the third portion of the duodenum. This appears less distended than previously seen and slightly irregular. There is no wall thickening in the diverticulum or the adjacent duodenum. Findings are more likely related to acute pancreatitis than diverticulitis  - Abdominal Ultrasound: No cholelithiasis.  No evidence of cholecystitis. Findings consistent with pancreatic head pancreatitis. Distended CBD. No choledocholithiasis is identified  - pain control: pt reports morphine did not help with pain in the ED - switched back to dilaudid but at a lower dose. - GI has seen, plans MRCP    Gram Negative Bacteremia:   - on levaquin, will continue  - repeat BC  - UA negative. Abdominal source likely. Transaminitis and mild hyperbilirubinemia: unclear etiology. - await MRI  - recheck enzymes in am    Hx of alcohol use:    - no s/s withdrawal at present  - CIWA monitoring  - ativan prn    Hx of prostate cancer: hx prostatectomy    Hx of GERD: Pepcid IV    Code status: Full  DVT prophylaxis: SCDs  Care Plan discussed with: patient, wife and nurse  Disposition: Home when able     Hospital Problems  Date Reviewed: 1/9/2018          Codes Class Noted POA    * (Principal)Pancreatitis ICD-10-CM: K85.90  ICD-9-CM: 175.1  1/9/2018 Unknown            Review of Systems:   Denies HA. No chest pain or pressure. No respiratory complaints. Had mild abdominal discomfort, rated ~ 2/10 on pain scale and described as sore. Vital Signs:   Last 24hrs VS reviewed since prior progress note. Most recent are:  Visit Vitals    BP 95/58 (BP 1 Location: Right arm, BP Patient Position: At rest)    Pulse 71    Temp 97.5 °F (36.4 °C)    Resp 18    Ht 6' (1.829 m)    Wt 93.4 kg (205 lb 14.6 oz)    SpO2 96%    BMI 27.93 kg/m2       Intake/Output Summary (Last 24 hours) at 01/10/18 1122  Last data filed at 01/10/18 0159   Gross per 24 hour   Intake           602.08 ml   Output              100 ml   Net           502.08 ml      Physical Examination:         Constitutional:  No acute distress, cooperative, pleasant    ENT:  Oral mucous moist, oropharynx benign. Resp:  CTA bilaterally. No wheezing/rales. CV:  Regular rhythm, normal rate    GI:  Soft, non distended, Epigastric soreness.  Normoactive bowel sounds    Musculoskeletal:  No edema, warm, 2+ pulses throughout    Neurologic:  Moves all extremities. AAOx3, CN II-XII reviewed      Data Review:   Review and/or order of clinical lab test  Review and/or order of tests in the radiology section of CPT  Review and/or order of tests in the medicine section of [facility]    Labs:     Recent Labs      01/10/18   0453  01/09/18   1550   WBC  18.9*  10.8   HGB  13.0  15.6   HCT  39.4  45.9   PLT  188  268     Recent Labs      01/10/18   0453  01/09/18   1550   NA   --   140   K   --   4.0   CL   --   104   CO2   --   29   BUN   --   13   CREA   --   1.25   GLU   --   116*   CA   --   9.5   MG  1.3*   --    PHOS  2.9   --      Recent Labs      01/10/18   0453  01/09/18   1550   SGOT   --   213*   ALT   --   98*   AP   --   110   TBILI   --   1.1*   TP   --   8.3*   ALB   --   4.4   GLOB   --   3.9   LPSE  >3000*  >3000*     Recent Labs      01/09/18   2116   INR  1.0   PTP  10.6      No results for input(s): FE, TIBC, PSAT, FERR in the last 72 hours. No results found for: FOL, RBCF   No results for input(s): PH, PCO2, PO2 in the last 72 hours.   Recent Labs      01/10/18   0453  01/09/18   2116   CPK  116  134     Lab Results   Component Value Date/Time    Cholesterol, total 152 01/10/2018 04:53 AM    HDL Cholesterol 68 01/10/2018 04:53 AM    LDL, calculated 71.6 01/10/2018 04:53 AM    Triglyceride 62 01/10/2018 04:53 AM    CHOL/HDL Ratio 2.2 01/10/2018 04:53 AM     No results found for: [facility]  Lab Results   Component Value Date/Time    Color DARK YELLOW 01/09/2018 11:34 PM    Appearance CLEAR 01/09/2018 11:34 PM    Specific gravity 1.020 01/09/2018 11:34 PM    Specific gravity 1.014 04/07/2009 02:41 PM    pH (UA) 5.0 01/09/2018 11:34 PM    Protein NEGATIVE  01/09/2018 11:34 PM    Glucose NEGATIVE  01/09/2018 11:34 PM    Ketone NEGATIVE  01/09/2018 11:34 PM    Bilirubin NEGATIVE  04/07/2009 02:41 PM    Urobilinogen 0.2 01/09/2018 11:34 PM    Nitrites NEGATIVE  01/09/2018 11:34 PM    Leukocyte Esterase NEGATIVE  01/09/2018 11:34 PM    Epithelial cells FEW 01/09/2018 11:34 PM    Bacteria NEGATIVE  01/09/2018 11:34 PM    WBC 0-4 01/09/2018 11:34 PM    RBC 0-5 01/09/2018 11:34 PM     Medications Reviewed:     Current Facility-Administered Medications   Medication Dose Route Frequency    lactated Ringers infusion  150 mL/hr IntraVENous CONTINUOUS    morphine injection 1 mg  1 mg IntraVENous Q4H PRN    sodium chloride (NS) flush 5-10 mL  5-10 mL IntraVENous Q8H    sodium chloride (NS) flush 5-10 mL  5-10 mL IntraVENous PRN    ondansetron (ZOFRAN) injection 4 mg  4 mg IntraVENous Q4H PRN    metroNIDAZOLE (FLAGYL) IVPB premix 500 mg  500 mg IntraVENous Q6H    levoFLOXacin (LEVAQUIN) 750 mg in D5W IVPB  750 mg IntraVENous Q24H    LORazepam (ATIVAN) injection 1 mg  1 mg IntraVENous Q4H PRN    0.9% sodium chloride 3,314 mL with folic acid 1 mg, thiamine 100 mg, mvi, adult no. 4 with vit K 10 mL infusion   IntraVENous DAILY    famotidine (PF) (PEPCID) 20 mg in sodium chloride 0.9 % 10 mL injection  20 mg IntraVENous Q12H   ______________________________________________________________________  EXPECTED LENGTH OF STAY: - - -  ACTUAL LENGTH OF STAY:          [address], NP

## 2018-01-10 NOTE — PROGRESS NOTES
Primary Nurse Heena Estes RN and BABS [place] performed a dual skin assessment on this patient No impairment noted

## 2018-01-10 NOTE — CONSULTS
[address] [facility] Edita Miller NOTE  [facility] office  [phone] NP in-hospital cell phone M-F until 4:30  After 5pm or on weekends, please call  for physician on call        NAME:  Guy Harkins   :   1955   MRN:   [id number]       Referring Physician: Chayito Chapman    Consult Date: 1/10/2018 10:06 AM    Chief Complaint: abdominal pain     History of Present Illness:  Patient is a 57 y.o. who is seen in consultation at the request of Dr. Lesli Bryan for abdominal pain. Medical history as listed below includes prostate cancer status post prostatectomy, GERD, and diverticulitis. He came to the hospital for abdominal pain which he believed was acid reflux and had been treating with OTC. He was seen in the office yesterday but presented to the ER as the pain increased. He was found to have acute pancreatitis with leukocytosis and lipase >3000. He reports chronic GI issues for over 30 years with IBS-M and GERD, never pancreatitis. He reports sudden onset of abdominal pain yesterday with nausea, vomiting, and shaking. He had a normal CT scan in December. He reports drinking at least 3 drinks nightly. He has been trying periods of sobriety (longest 1 week) with improvement in GI symptoms. He denies hematemesis, dysphagia, constipation, diarrhea, melena, or hematochezia. I have reviewed the emergency room note, hospital admission note, notes by all other clinicians who have seen the patient during this hospitalization to date. I have reviewed the problem list and the reason for this hospitalization. I have reviewed the allergies and the medications the patient was taking at home prior to this hospitalization.     PMH:  Past Medical History:   Diagnosis Date    Prostate CA ([address]        PSH:  Past Surgical History:   Procedure Laterality Date    HX PROSTATECTOMY         Allergies:  No Known Allergies    Home Medications:  Prior to Admission Medications   Prescriptions Last Dose Informant Patient Reported? Taking?   aluminum hydrox-magnesium carb (GAVISCON)  mg/15 mL suspension 1/2/2018 at Unknown time  Yes Yes   Sig: Take 15 mL by mouth as needed for Indigestion. famotidine (PEPCID) 10 mg tablet 1/2/2018 at Unknown time  Yes Yes   Sig: Take  by mouth two (2) times a day. pantoprazole (PROTONIX) 20 mg tablet 1/2/2018 at Unknown time  Yes Yes   Sig: Take  by mouth daily. Facility-Administered Medications: None       Hospital Medications:  Current Facility-Administered Medications   Medication Dose Route Frequency    sodium chloride (NS) flush 5-10 mL  5-10 mL IntraVENous Q8H    sodium chloride (NS) flush 5-10 mL  5-10 mL IntraVENous PRN    lactated Ringers infusion 200 mL  200 mL IntraVENous CONTINUOUS    HYDROmorphone (PF) (DILAUDID) injection 1 mg  1 mg IntraVENous Q4H PRN    ondansetron (ZOFRAN) injection 4 mg  4 mg IntraVENous Q4H PRN    metroNIDAZOLE (FLAGYL) IVPB premix 500 mg  500 mg IntraVENous Q6H    levoFLOXacin (LEVAQUIN) 750 mg in D5W IVPB  750 mg IntraVENous Q24H    LORazepam (ATIVAN) injection 1 mg  1 mg IntraVENous Q4H PRN    0.9% sodium chloride 3,106 mL with folic acid 1 mg, thiamine 100 mg, mvi, adult no. 4 with vit K 10 mL infusion   IntraVENous DAILY    famotidine (PF) (PEPCID) 20 mg in sodium chloride 0.9 % 10 mL injection  20 mg IntraVENous Q12H       Social History:  Social History   Substance Use Topics    Smoking status: Current Some Day Smoker    Smokeless tobacco: Never Used    Alcohol use Yes       Family History:  History reviewed. No pertinent family history.     Review of Systems:    Constitutional: negative fever, negative chills, negative weight loss  Eyes:   negative visual changes  ENT:   negative sore throat, tongue or lip swelling  Respiratory:  negative cough, negative dyspnea  Cards:  negative for chest pain, palpitations, lower extremity edema  GI:   See HPI  :  +for frequency, dysuria  Integument:  negative for rash and pruritus  Heme:  negative for easy bruising and gum/nose bleeding  Musculoskeletal: negative for myalgias, back pain and muscle weakness  Neuro:             negative for headaches, dizziness, vertigo  Psych:  negative for feelings of anxiety, depression     Objective:   Patient Vitals for the past 8 hrs:   BP Temp Pulse Resp SpO2   01/10/18 0450 95/58 97.5 °F (36.4 °C) 71 18 96 %        01/08 1901 - 01/10 0700  In: 602.1 [I.V.:602.1]  Out: 100 [Urine:100]    EXAM:     CONST:  Pleasant male sitting on side of bed, no acute distress   NEURO:  alert and oriented x 3   HEENT: EOMI, no scleral icterus   LUNGS: clear to ausculation, (-) wheeze   CARD:  regular rate and rhythm, S1 S2   ABD:  soft, RUQ and epigastric tenderness, no rebound, bowel sounds (+) all 4 quadrants, no masses, non distended   EXT:  no edema, warm   PSYCH: full, not anxious     Data Review     Recent Labs      01/10/18   0453  01/09/18   1550   WBC  18.9*  10.8   HGB  13.0  15.6   HCT  39.4  45.9   PLT  188  268     Recent Labs      01/10/18   0453  01/09/18   1550   NA   --   140   K   --   4.0   CL   --   104   CO2   --   29   BUN   --   13   CREA   --   1.25   GLU   --   116*   PHOS  2.9   --    CA   --   9.5     Recent Labs      01/10/18   0453  01/09/18   1550   SGOT   --   213*   AP   --   110   TP   --   8.3*   ALB   --   4.4   GLOB   --   3.9   LPSE  >3000*  >3000*     Recent Labs      01/09/18   2116   INR  1.0   PTP  10.6   IMPRESSION:     1. There is edema in the head of the pancreas with surrounding inflammatory stranding extending into the pancreatic groove. A duodenal diverticulum is again seen from the third portion of the duodenum. This appears less distended than previously seen and slightly irregular. There is no wall thickening in the diverticulum or the adjacent duodenum.  Findings are more likely related to acute pancreatitis than diverticulitis. 2. Status post prostatectomy. IMPRESSION:   No cholelithiasis. No evidence of cholecystitis. Findings consistent with pancreatic head pancreatitis. Distended CBD. No choledocholithiasis is identified. Otherwise unremarkable abdominal ultrasound. Assessment:   · Pancreatitis: likely due to alcohol versus gallstones; triglycerides normal  · Elevated LFTs: , ALT 98; alk phos 110; bilirubin 1.1  · Gram negative rods in blood     Patient Active Problem List   Diagnosis Code    Pancreatitis K85.90     Plan:   · Maintain NPO  · LR  · Continue supportive care - Pain/nausea medicine  · MRCP ordered  · Continue antibiotics  · Thank you for allowing me to participate in care of Zhen Kent     Signed By: Harlan Madrigal NP     1/10/2018  10:06 AM       GI attending note:    Gen: NAD; Cardiac: nml S1, S2; Pulm: CTA B; Abd: normoactive BS, mild epigastric tenderness, nd, no rebound/guarding. Vitals, labs, and imaging reviewed. Agree with the history, physical, and plan of the NP. Pancreatitis-likely EtOH related. Will evaluate for CBD stone with bile duct dilation. Abx. Hemodynamically stable. Dr. Marlon Garcia to follow tomorrow. Thank you for this consultation.     Dr. Satnam Guzmán

## 2018-01-10 NOTE — PROGRESS NOTES
Care Management Interventions  PCP Verified by CM: Yes  Mode of Transport at Discharge: Other (see comment) (private vehicle)  Discharge Durable Medical Equipment: No  Physical Therapy Consult: No  Occupational Therapy Consult: No  Speech Therapy Consult: No  Current Support Network: Lives with Spouse (independent with ADLs)  Confirm Follow Up Transport: Family  Plan discussed with Pt/Family/Caregiver: Yes  Freedom of Choice Offered: Yes  [place] Resource Information Provided?: No  Discharge Location  Discharge Placement: Home    CM reviewed chart. Pt was independent with ADLs and IADLs. Pt lives with his wife in a private residence. Pt's PCP is Dr. Dee Hayes. Plan is to return home at time of discharge, family will transport.   CIERA Salmeron, ACM

## 2018-01-10 NOTE — PROGRESS NOTES
2240 - Bedside and Verbal shift change report given to walter antonio (oncoming nurse) by Amber norton (offgoing nurse). Report included the following information SBAR, Kardex, ED Summary, Intake/Output, MAR and Recent Results. 2300 - patient off floor at [facility]. 0000 - report called to [facility].

## 2018-01-10 NOTE — PROGRESS NOTES
TRANSFER - IN REPORT:    Verbal report received from walter(name) on Candice Hernandez  being received from ED(unit) for change in patient condition(pancreatitis)      Report consisted of patients Situation, Background, Assessment and   Recommendations(SBAR). Information from the following report(s) SBAR, ED Summary and Cardiac Rhythm nsr was reviewed with the receiving nurse. Opportunity for questions and clarification was provided. Assessment completed upon patients arrival to unit and care assumed.

## 2018-01-10 NOTE — ROUTINE PROCESS
TRANSFER - OUT REPORT:    Verbal report given to sg(name) on Eugenio Sep  being transferred to [facility](unit) for routine progression of care       Report consisted of patients Situation, Background, Assessment and   Recommendations(SBAR). Information from the following report(s) SBAR, Kardex, ED Summary, Intake/Output, MAR, Recent Results and Cardiac Rhythm NSR was reviewed with the receiving nurse. Lines:   Peripheral IV 01/09/18 Left Antecubital (Active)   Site Assessment Clean, dry, & intact 1/9/2018  3:57 PM   Phlebitis Assessment 0 1/9/2018  3:57 PM   Infiltration Assessment 0 1/9/2018  3:57 PM   Dressing Status Clean, dry, & intact 1/9/2018  3:57 PM   Dressing Type Transparent 1/9/2018  3:57 PM   Hub Color/Line Status Pink;Flushed;Patent 1/9/2018  3:57 PM   Action Taken Blood drawn 1/9/2018  3:57 PM       Peripheral IV 01/09/18 Right Antecubital (Active)        Opportunity for questions and clarification was provided.       Patient transported with:   [employer]

## 2018-01-10 NOTE — PROGRESS NOTES
Bedside and Verbal shift change report given to [facility] jailyn (oncoming nurse) by Alfa Geiger (offgoing nurse). Report included the following information SBAR, Kardex, ED Summary, Intake/Output, Recent Results and Cardiac Rhythm nsr.

## 2018-01-11 LAB
ALBUMIN SERPL-MCNC: 2.8 G/DL (ref 3.5–5)
ALBUMIN/GLOB SERPL: 1 {RATIO} (ref 1.1–2.2)
ALP SERPL-CCNC: 55 U/L (ref 45–117)
ALT SERPL-CCNC: 71 U/L (ref 12–78)
ANION GAP SERPL CALC-SCNC: 8 MMOL/L (ref 5–15)
AST SERPL-CCNC: 51 U/L (ref 15–37)
BASOPHILS # BLD: 0 K/UL (ref 0–0.1)
BASOPHILS NFR BLD: 0 % (ref 0–1)
BILIRUB SERPL-MCNC: 0.6 MG/DL (ref 0.2–1)
BUN SERPL-MCNC: 18 MG/DL (ref 6–20)
BUN/CREAT SERPL: 18 (ref 12–20)
CALCIUM SERPL-MCNC: 8.2 MG/DL (ref 8.5–10.1)
CHLORIDE SERPL-SCNC: 108 MMOL/L (ref 97–108)
CK SERPL-CCNC: 117 U/L (ref 39–308)
CO2 SERPL-SCNC: 23 MMOL/L (ref 21–32)
CREAT SERPL-MCNC: 1.01 MG/DL (ref 0.7–1.3)
EOSINOPHIL # BLD: 0 K/UL (ref 0–0.4)
EOSINOPHIL NFR BLD: 0 % (ref 0–7)
ERYTHROCYTE [DISTWIDTH] IN BLOOD BY AUTOMATED COUNT: 13.4 % (ref 11.5–14.5)
GLOBULIN SER CALC-MCNC: 2.9 G/DL (ref 2–4)
GLUCOSE SERPL-MCNC: 80 MG/DL (ref 65–100)
HCT VFR BLD AUTO: 34.8 % (ref 36.6–50.3)
HGB BLD-MCNC: 11.6 G/DL (ref 12.1–17)
LIPASE SERPL-CCNC: 862 U/L (ref 73–393)
LYMPHOCYTES # BLD: 0.8 K/UL (ref 0.8–3.5)
LYMPHOCYTES NFR BLD: 6 % (ref 12–49)
MAGNESIUM SERPL-MCNC: 1.8 MG/DL (ref 1.6–2.4)
MCH RBC QN AUTO: 31 PG (ref 26–34)
MCHC RBC AUTO-ENTMCNC: 33.3 G/DL (ref 30–36.5)
MCV RBC AUTO: 93 FL (ref 80–99)
MONOCYTES # BLD: 0.8 K/UL (ref 0–1)
MONOCYTES NFR BLD: 5 % (ref 5–13)
NEUTS SEG # BLD: 12.3 K/UL (ref 1.8–8)
NEUTS SEG NFR BLD: 89 % (ref 32–75)
PLATELET # BLD AUTO: 150 K/UL (ref 150–400)
POTASSIUM SERPL-SCNC: 3.7 MMOL/L (ref 3.5–5.1)
PROT SERPL-MCNC: 5.7 G/DL (ref 6.4–8.2)
RBC # BLD AUTO: 3.74 M/UL (ref 4.1–5.7)
SODIUM SERPL-SCNC: 139 MMOL/L (ref 136–145)
WBC # BLD AUTO: 13.9 K/UL (ref 4.1–11.1)

## 2018-01-11 PROCEDURE — 74011250636 HC RX REV CODE- 250/636: Performed by: INTERNAL MEDICINE

## 2018-01-11 PROCEDURE — 74011250637 HC RX REV CODE- 250/637: Performed by: HOSPITALIST

## 2018-01-11 PROCEDURE — 36415 COLL VENOUS BLD VENIPUNCTURE: CPT | Performed by: NURSE PRACTITIONER

## 2018-01-11 PROCEDURE — 74011250636 HC RX REV CODE- 250/636: Performed by: NURSE PRACTITIONER

## 2018-01-11 PROCEDURE — 83735 ASSAY OF MAGNESIUM: CPT | Performed by: NURSE PRACTITIONER

## 2018-01-11 PROCEDURE — 74011250637 HC RX REV CODE- 250/637: Performed by: NURSE PRACTITIONER

## 2018-01-11 PROCEDURE — 85025 COMPLETE CBC W/AUTO DIFF WBC: CPT | Performed by: NURSE PRACTITIONER

## 2018-01-11 PROCEDURE — 65270000029 HC RM PRIVATE

## 2018-01-11 PROCEDURE — 80053 COMPREHEN METABOLIC PANEL: CPT | Performed by: NURSE PRACTITIONER

## 2018-01-11 PROCEDURE — 74011250636 HC RX REV CODE- 250/636: Performed by: FAMILY MEDICINE

## 2018-01-11 PROCEDURE — 83690 ASSAY OF LIPASE: CPT | Performed by: NURSE PRACTITIONER

## 2018-01-11 PROCEDURE — 74011000250 HC RX REV CODE- 250: Performed by: FAMILY MEDICINE

## 2018-01-11 PROCEDURE — 82550 ASSAY OF CK (CPK): CPT | Performed by: FAMILY MEDICINE

## 2018-01-11 RX ORDER — ACETAMINOPHEN 325 MG/1
650 TABLET ORAL
Status: DISCONTINUED | OUTPATIENT
Start: 2018-01-11 | End: 2018-01-13 | Stop reason: HOSPADM

## 2018-01-11 RX ORDER — SODIUM CHLORIDE, SODIUM LACTATE, POTASSIUM CHLORIDE, CALCIUM CHLORIDE 600; 310; 30; 20 MG/100ML; MG/100ML; MG/100ML; MG/100ML
25 INJECTION, SOLUTION INTRAVENOUS CONTINUOUS
Status: DISCONTINUED | OUTPATIENT
Start: 2018-01-11 | End: 2018-01-12

## 2018-01-11 RX ORDER — BUTALBITAL, ACETAMINOPHEN AND CAFFEINE 50; 325; 40 MG/1; MG/1; MG/1
1 TABLET ORAL
Status: DISCONTINUED | OUTPATIENT
Start: 2018-01-11 | End: 2018-01-13 | Stop reason: HOSPADM

## 2018-01-11 RX ADMIN — BUTALBITAL, ACETAMINOPHEN AND CAFFEINE 1 TABLET: 50; 325; 40 TABLET ORAL at 13:40

## 2018-01-11 RX ADMIN — LEVOFLOXACIN 750 MG: 5 INJECTION, SOLUTION INTRAVENOUS at 23:29

## 2018-01-11 RX ADMIN — BUTALBITAL, ACETAMINOPHEN AND CAFFEINE 1 TABLET: 50; 325; 40 TABLET ORAL at 18:11

## 2018-01-11 RX ADMIN — ACETAMINOPHEN 650 MG: 325 TABLET, FILM COATED ORAL at 11:12

## 2018-01-11 RX ADMIN — FOLIC ACID: 5 INJECTION, SOLUTION INTRAMUSCULAR; INTRAVENOUS; SUBCUTANEOUS at 09:18

## 2018-01-11 RX ADMIN — SODIUM CHLORIDE, SODIUM LACTATE, POTASSIUM CHLORIDE, AND CALCIUM CHLORIDE 150 ML/HR: 600; 310; 30; 20 INJECTION, SOLUTION INTRAVENOUS at 01:52

## 2018-01-11 RX ADMIN — METRONIDAZOLE 500 MG: 500 INJECTION, SOLUTION INTRAVENOUS at 06:36

## 2018-01-11 RX ADMIN — Medication 10 ML: at 21:00

## 2018-01-11 RX ADMIN — Medication 10 ML: at 06:36

## 2018-01-11 RX ADMIN — FAMOTIDINE 20 MG: 10 INJECTION, SOLUTION INTRAVENOUS at 20:40

## 2018-01-11 RX ADMIN — FAMOTIDINE 20 MG: 10 INJECTION, SOLUTION INTRAVENOUS at 09:19

## 2018-01-11 RX ADMIN — METRONIDAZOLE 500 MG: 500 INJECTION, SOLUTION INTRAVENOUS at 11:48

## 2018-01-11 RX ADMIN — SODIUM CHLORIDE, POTASSIUM CHLORIDE, SODIUM LACTATE AND CALCIUM CHLORIDE 25 ML/HR: 600; 310; 30; 20 INJECTION, SOLUTION INTRAVENOUS at 20:50

## 2018-01-11 RX ADMIN — HYDROMORPHONE HYDROCHLORIDE 0.5 MG: 2 INJECTION, SOLUTION INTRAMUSCULAR; INTRAVENOUS; SUBCUTANEOUS at 04:08

## 2018-01-11 NOTE — PROGRESS NOTES
Bedside and Verbal shift change report given to [facility]. Report included the following information SBAR.

## 2018-01-11 NOTE — PROGRESS NOTES
[address]  [place], [address]    GI PROGRESS NOTE    NAME: Ephraim Henley   :  1955   MRN:  [id number]       Subjective:   Denies abdominal pain. Afebrile overnight. Tolerating ice chips. LFT's trending down. WBC trending down. Preliminary MRCP read with normal CBD, no CBD stones, pericholecystic fluid, but no gallbladder stones or sludge. Review of Systems    Constitutional: negative fever, negative chills, negative weight loss  Eyes:   negative visual changes  ENT:   negative sore throat, tongue or lip swelling  Respiratory:  negative cough, negative dyspnea  Cards:  negative for chest pain, palpitations, lower extremity edema  GI:   See HPI  :  negative for frequency, dysuria  Integument:  negative for rash and pruritus  Heme:  negative for easy bruising and gum/nose bleeding  Musculoskel: negative for myalgias,  back pain and muscle weakness  Neuro: negative for headaches, dizziness, vertigo  Psych:  negative for feelings of anxiety, depression         Objective:     VITALS:   Last 24hrs VS reviewed since prior progress note. Most recent are:  Visit Vitals    /65 (BP 1 Location: Left arm, BP Patient Position: At rest)    Pulse 66    Temp 97.5 °F (36.4 °C)    Resp 16    Ht 6' (1.829 m)    Wt 91.6 kg (201 lb 15.1 oz)    SpO2 96%    BMI 27.39 kg/m2       Intake/Output Summary (Last 24 hours) at 18 0738  Last data filed at 18 0343   Gross per 24 hour   Intake          3593.75 ml   Output             1200 ml   Net          2393.75 ml     PHYSICAL EXAM:  General: WD, WN. Alert, cooperative, no acute distress    HEENT: NC, Atraumatic. Anicteric sclerae. Abdomen: Soft, Non distended, Non tender.  +Bowel sounds, no HSM  Extremities: No c/c/e  Neurologic:  Alert and oriented X 3. No acute neurological distress   Psych:   Good insight. Not anxious nor agitated.     Lab Data Reviewed:   Recent Labs      18   0340  01/10/18   8041 WBC  13.9*  18.9*   HGB  11.6*  13.0   HCT  34.8*  39.4   PLT  150  188     Recent Labs      01/11/18   0340  01/10/18   0453  01/09/18   1550   NA  139   --   140   K  3.7   --   4.0   CL  108   --   104   CO2  23   --   29   BUN  18   --   13   CREA  1.01   --   1.25   GLU  80   --   116*   PHOS   --   2.9   --    CA  8.2*   --   9.5     Recent Labs      01/11/18   0340  01/10/18   0453  01/09/18   1550   SGOT  51*   --   213*   AP  55   --   110   TP  5.7*   --   8.3*   ALB  2.8*   --   4.4   GLOB  2.9   --   3.9   LPSE  862*  >3000*  >3000*       ________________________________________________________________________       Assessment:   · Acute pancreatitis - likely a passed gallstone based on clinical course, LFT trend, and resolution of CBD dilation radiologically. Pancreatitis is mild clinically, and patient is improving with conservative measures  · Bacteremia - likely due to biliary obstruction, which appears to have resolved based on MRCP and LFT trend     Plan:   · NPO except ice chips  · Will plan for EUS +/- ERCP tomorrow  · Continue antibiotics and supportive care  · Monitor CBC and LFT's daily  · Follow up final read of MRCP  · Consider ID and Surgery consultations based on clinical course  · Will follow. Please call with any questions. Signed By: Didi Watkins MD     1/11/2018  7:38 AM

## 2018-01-11 NOTE — PROGRESS NOTES
Problem: Falls - Risk of  Goal: *Absence of Falls  Document Moi Fall Risk and appropriate interventions in the flowsheet.    Outcome: Progressing Towards Goal  Fall Risk Interventions:            Medication Interventions: Teach patient to arise slowly       Patient is steady on his feet and up with one assistance           Problem: Pancreatitis  Goal: *Control of acute pain  Outcome: Progressing Towards Goal  Patient pain is well controlled with IV dilaudid at this time   Goal: *Absence of nausea/vomiting  Outcome: Progressing Towards Goal  Patient is experiencing no nausea   Goal: *Optimize nutritional status  Outcome: Progressing Towards Goal  NPO status currently

## 2018-01-11 NOTE — PROGRESS NOTES
Hospitalist Progress Note  Tracy Cruz NP  Answering service: [phone] from in house phone  Cell: [phone]      Date of Service:  2018  NAME:  Chico oMhan  :  1955  MRN:  [id number]    Admission Summary:   Pt was referred to the ED due to unrelenting abdominal pain. The patient reports indicated he has been having some abdominal pain on/off for about a year. Has been attributing it to acid reflux, but in the last 2-3 days, he started experiencing significant abdominal pain. He saw Dr. Srikanth uRssell in the office the day of admit  this morning, had some blood work done and was told to take Zantac, but continued to have exceptional pain. When his called the office again and was told to come to the ER. The patient was found to have an acute pancreatitis in the ED Denies ever having pancreatitis, gallbladder issues or liver issues in the past.  His wife reports that he drinks three beers on a daily basis and on weekends probably adds three glasses of wine to that. Admits to some nausea but denies any vomiting. The pts reported chills the am of admit. Interval history / Subjective:   Pt lying in bed, wife at bedside. Has no abdominal pain but has a HA. Verbalized understanding of plan of care. Assessment & Plan:     Acute pancreatitis:    - IV hydration, pain control prn  - CT abdomen: edema in the head of the pancreas with surrounding inflammatory stranding extending into the pancreatic groove. A duodenal diverticulum is again seen from the third portion of the duodenum. This appears less distended than previously seen and slightly irregular. There is no wall thickening in the diverticulum or the adjacent duodenum. Findings are more likely related to acute pancreatitis than diverticulitis  - Abdominal Ultrasound: No cholelithiasis. No evidence of cholecystitis. Findings consistent with pancreatic head pancreatitis. Distended CBD. No choledocholithiasis is identified  - pain control: pt reports morphine did not help with pain in the ED - switched back to dilaudid but at a lower dose. - MRCP 1/10: acute pancreatitis. No drainable fluid collection. No pancreatic necrosis or  Divisum. Duodenal diverticulum arises from the third portion and extends toward the  pancreatic head. Equivocal evidence of early or resolving cholecystitis. No biliary  obstruction or choledocholithiasis  - GI feels pt likely a passed gallstone   - lipase significantly improved and pt reports no abdominal pain  - planning for EUS and +/- ERCP tomorrow  - may have ice chips     Gram Negative Bacteremia:   - admit BC GNR from two different sites. - on levaquin, will continue for now. Stopped flagyl today. - repeat BC with no growth thus far. - UA negative. Abdominal source likely. Transaminitis and mild hyperbilirubinemia: unclear etiology. - resolving with fluids and NPO    Hx of alcohol use:    - no s/s withdrawal  - CIWA monitoring, ativan prn    Hx of prostate cancer: hx prostatectomy    Hx of GERD: Pepcid IV    Headache:   - was given tylenol a little while ago  - ordered Fioricet if tylenol ineffective    Code status: Full  DVT prophylaxis: SCDs  Care Plan discussed with: patient, wife   Disposition: Home when able    May stop tele, transfer to medical floor     Hospital Problems  Date Reviewed: 1/9/2018          Codes Class Noted POA    * (Principal)Pancreatitis ICD-10-CM: K85.90  ICD-9-CM: 164.5  1/9/2018 Unknown            Review of Systems:   + HA. No chest pain or pressure. No respiratory complaints. No abdominal discomfort. Vital Signs:   Last 24hrs VS reviewed since prior progress note.  Most recent are:  Visit Vitals    /74 (BP 1 Location: Left arm, BP Patient Position: At rest)    Pulse 63    Temp 98.5 °F (36.9 °C)    Resp 16    Ht 6' (1.829 m)    Wt 91.6 kg (201 lb 15.1 oz)    SpO2 96%    BMI 27.39 kg/m2 Intake/Output Summary (Last 24 hours) at 01/11/18 1201  Last data filed at 01/11/18 0817   Gross per 24 hour   Intake           3757.5 ml   Output             1075 ml   Net           2682.5 ml      Physical Examination:         Constitutional:  No acute distress, cooperative, pleasant.+ generalized HA.    ENT:  Oral mucous moist, oropharynx benign. Resp:  CTA bilaterally. No wheezing/rales. CV:  Regular rhythm, normal rate    GI:  Soft, non distended. Normoactive bowel sounds    Musculoskeletal:  No edema, warm, 2+ pulses throughout    Neurologic:  Moves all extremities. AAOx3      Data Review:   Review and/or order of clinical lab test  Review and/or order of tests in the radiology section of [facility]  Review and/or order of tests in the medicine section of [facility]    Labs:     Recent Labs      01/11/18   0340  01/10/18   0453   WBC  13.9*  18.9*   HGB  11.6*  13.0   HCT  34.8*  39.4   PLT  150  188     Recent Labs      01/11/18   0340  01/10/18   0453  01/09/18   1550   NA  139   --   140   K  3.7   --   4.0   CL  108   --   104   CO2  23   --   29   BUN  18   --   13   CREA  1.01   --   1.25   GLU  80   --   116*   CA  8.2*   --   9.5   MG  1.8  1.3*   --    PHOS   --   2.9   --      Recent Labs      01/11/18   0340  01/10/18   0453  01/09/18   1550   SGOT  51*   --   213*   ALT  71   --   98*   AP  55   --   110   TBILI  0.6   --   1.1*   TP  5.7*   --   8.3*   ALB  2.8*   --   4.4   GLOB  2.9   --   3.9   LPSE  862*  >3000*  >3000*     Recent Labs      01/09/18 2116   INR  1.0   PTP  10.6      No results for input(s): FE, TIBC, PSAT, FERR in the last 72 hours. No results found for: FOL, RBCF   No results for input(s): PH, PCO2, PO2 in the last 72 hours.   Recent Labs      01/11/18   0340  01/10/18   0453  01/09/18   2116   CPK  117  116  134     Lab Results   Component Value Date/Time    Cholesterol, total 152 01/10/2018 04:53 AM    HDL Cholesterol 68 01/10/2018 04:53 AM    LDL, calculated 71.6 01/10/2018 04:53 AM    Triglyceride 62 01/10/2018 04:53 AM    CHOL/HDL Ratio 2.2 01/10/2018 04:53 AM     No results found for: [facility]  Lab Results   Component Value Date/Time    Color DARK YELLOW 01/09/2018 11:34 PM    Appearance CLEAR 01/09/2018 11:34 PM    Specific gravity 1.020 01/09/2018 11:34 PM    Specific gravity 1.014 04/07/2009 02:41 PM    pH (UA) 5.0 01/09/2018 11:34 PM    Protein NEGATIVE  01/09/2018 11:34 PM    Glucose NEGATIVE  01/09/2018 11:34 PM    Ketone NEGATIVE  01/09/2018 11:34 PM    Bilirubin NEGATIVE  04/07/2009 02:41 PM    Urobilinogen 0.2 01/09/2018 11:34 PM    Nitrites NEGATIVE  01/09/2018 11:34 PM    Leukocyte Esterase NEGATIVE  01/09/2018 11:34 PM    Epithelial cells FEW 01/09/2018 11:34 PM    Bacteria NEGATIVE  01/09/2018 11:34 PM    WBC 0-4 01/09/2018 11:34 PM    RBC 0-5 01/09/2018 11:34 PM     Medications Reviewed:     Current Facility-Administered Medications   Medication Dose Route Frequency    acetaminophen (TYLENOL) tablet 650 mg  650 mg Oral Q6H PRN    butalbital-acetaminophen-caffeine (FIORICET, ESGIC) -40 mg per tablet 1 Tab  1 Tab Oral Q6H PRN    lactated Ringers infusion  150 mL/hr IntraVENous CONTINUOUS    HYDROmorphone (PF) (DILAUDID) injection 0.5 mg  0.5 mg IntraVENous Q4H PRN    sodium chloride (NS) flush 5-10 mL  5-10 mL IntraVENous Q8H    sodium chloride (NS) flush 5-10 mL  5-10 mL IntraVENous PRN    ondansetron (ZOFRAN) injection 4 mg  4 mg IntraVENous Q4H PRN    levoFLOXacin (LEVAQUIN) 750 mg in D5W IVPB  750 mg IntraVENous Q24H    LORazepam (ATIVAN) injection 1 mg  1 mg IntraVENous Q4H PRN    0.9% sodium chloride 3,427 mL with folic acid 1 mg, thiamine 100 mg, mvi, adult no. 4 with vit K 10 mL infusion   IntraVENous DAILY    famotidine (PF) (PEPCID) 20 mg in sodium chloride 0.9 % 10 mL injection  20 mg IntraVENous Q12H   ______________________________________________________________________  EXPECTED LENGTH OF STAY: 2d 12h  ACTUAL LENGTH OF STAY: Ruben 48, NP

## 2018-01-11 NOTE — PROGRESS NOTES
Spiritual Care Partner Volunteer visited patient in 12 on 1.11.18.   Documented by:    Inés Wong M.Div, M.S, Vinny Hernandez1 available at [phone])

## 2018-01-11 NOTE — ROUTINE PROCESS
Bedside shift change report given to BABS Bridges (oncoming nurse) by Lashon Rowell RN (offgoing nurse). Report included the following information SBAR, ED Summary, Recent Results, Med Rec Status and Cardiac Rhythm NSR.

## 2018-01-12 ENCOUNTER — ANESTHESIA (OUTPATIENT)
Dept: ENDOSCOPY | Age: 63
DRG: 439 | End: 2018-01-12
Payer: COMMERCIAL

## 2018-01-12 ENCOUNTER — ANESTHESIA EVENT (OUTPATIENT)
Dept: ENDOSCOPY | Age: 63
DRG: 439 | End: 2018-01-12
Payer: COMMERCIAL

## 2018-01-12 LAB
ALBUMIN SERPL-MCNC: 2.9 G/DL (ref 3.5–5)
ALBUMIN/GLOB SERPL: 0.9 {RATIO} (ref 1.1–2.2)
ALP SERPL-CCNC: 58 U/L (ref 45–117)
ALT SERPL-CCNC: 55 U/L (ref 12–78)
ANION GAP SERPL CALC-SCNC: 7 MMOL/L (ref 5–15)
AST SERPL-CCNC: 35 U/L (ref 15–37)
BACTERIA SPEC CULT: ABNORMAL
BILIRUB SERPL-MCNC: 0.5 MG/DL (ref 0.2–1)
BUN SERPL-MCNC: 14 MG/DL (ref 6–20)
BUN/CREAT SERPL: 15 (ref 12–20)
CALCIUM SERPL-MCNC: 8.3 MG/DL (ref 8.5–10.1)
CHLORIDE SERPL-SCNC: 107 MMOL/L (ref 97–108)
CK SERPL-CCNC: 140 U/L (ref 39–308)
CK SERPL-CCNC: 142 U/L (ref 39–308)
CO2 SERPL-SCNC: 24 MMOL/L (ref 21–32)
CREAT SERPL-MCNC: 0.92 MG/DL (ref 0.7–1.3)
ERYTHROCYTE [DISTWIDTH] IN BLOOD BY AUTOMATED COUNT: 12.7 % (ref 11.5–14.5)
GLOBULIN SER CALC-MCNC: 3.3 G/DL (ref 2–4)
GLUCOSE SERPL-MCNC: 73 MG/DL (ref 65–100)
HCT VFR BLD AUTO: 34.6 % (ref 36.6–50.3)
HGB BLD-MCNC: 11.5 G/DL (ref 12.1–17)
LIPASE SERPL-CCNC: 247 U/L (ref 73–393)
MCH RBC QN AUTO: 30.3 PG (ref 26–34)
MCHC RBC AUTO-ENTMCNC: 33.2 G/DL (ref 30–36.5)
MCV RBC AUTO: 91.3 FL (ref 80–99)
PLATELET # BLD AUTO: 149 K/UL (ref 150–400)
POTASSIUM SERPL-SCNC: 3.6 MMOL/L (ref 3.5–5.1)
PROT SERPL-MCNC: 6.2 G/DL (ref 6.4–8.2)
RBC # BLD AUTO: 3.79 M/UL (ref 4.1–5.7)
SERVICE CMNT-IMP: ABNORMAL
SODIUM SERPL-SCNC: 138 MMOL/L (ref 136–145)
WBC # BLD AUTO: 7.7 K/UL (ref 4.1–11.1)

## 2018-01-12 PROCEDURE — 74011250636 HC RX REV CODE- 250/636

## 2018-01-12 PROCEDURE — 74011250636 HC RX REV CODE- 250/636: Performed by: FAMILY MEDICINE

## 2018-01-12 PROCEDURE — 76040000007: Performed by: INTERNAL MEDICINE

## 2018-01-12 PROCEDURE — 83690 ASSAY OF LIPASE: CPT | Performed by: NURSE PRACTITIONER

## 2018-01-12 PROCEDURE — 0DJ08ZZ INSPECTION OF UPPER INTESTINAL TRACT, VIA NATURAL OR ARTIFICIAL OPENING ENDOSCOPIC: ICD-10-PCS | Performed by: INTERNAL MEDICINE

## 2018-01-12 PROCEDURE — 85027 COMPLETE CBC AUTOMATED: CPT | Performed by: NURSE PRACTITIONER

## 2018-01-12 PROCEDURE — 74011000250 HC RX REV CODE- 250: Performed by: FAMILY MEDICINE

## 2018-01-12 PROCEDURE — 80053 COMPREHEN METABOLIC PANEL: CPT | Performed by: NURSE PRACTITIONER

## 2018-01-12 PROCEDURE — 36415 COLL VENOUS BLD VENIPUNCTURE: CPT | Performed by: FAMILY MEDICINE

## 2018-01-12 PROCEDURE — 76060000032 HC ANESTHESIA 0.5 TO 1 HR: Performed by: INTERNAL MEDICINE

## 2018-01-12 PROCEDURE — 65270000029 HC RM PRIVATE

## 2018-01-12 PROCEDURE — 77030007288 HC DEV LOK BILI BSC -A: Performed by: INTERNAL MEDICINE

## 2018-01-12 PROCEDURE — 74011250636 HC RX REV CODE- 250/636: Performed by: NURSE PRACTITIONER

## 2018-01-12 PROCEDURE — 82550 ASSAY OF CK (CPK): CPT | Performed by: FAMILY MEDICINE

## 2018-01-12 RX ORDER — EPINEPHRINE 0.1 MG/ML
1 INJECTION INTRACARDIAC; INTRAVENOUS ONCE
Status: DISCONTINUED | OUTPATIENT
Start: 2018-01-12 | End: 2018-01-12 | Stop reason: HOSPADM

## 2018-01-12 RX ORDER — FENTANYL CITRATE 50 UG/ML
100 INJECTION, SOLUTION INTRAMUSCULAR; INTRAVENOUS
Status: DISCONTINUED | OUTPATIENT
Start: 2018-01-12 | End: 2018-01-12 | Stop reason: HOSPADM

## 2018-01-12 RX ORDER — SODIUM CHLORIDE 9 MG/ML
100 INJECTION, SOLUTION INTRAVENOUS CONTINUOUS
Status: DISCONTINUED | OUTPATIENT
Start: 2018-01-12 | End: 2018-01-12

## 2018-01-12 RX ORDER — SODIUM CHLORIDE 0.9 % (FLUSH) 0.9 %
5-10 SYRINGE (ML) INJECTION EVERY 8 HOURS
Status: COMPLETED | OUTPATIENT
Start: 2018-01-12 | End: 2018-01-12

## 2018-01-12 RX ORDER — SODIUM CHLORIDE 9 MG/ML
INJECTION, SOLUTION INTRAVENOUS
Status: DISCONTINUED | OUTPATIENT
Start: 2018-01-12 | End: 2018-01-12 | Stop reason: HOSPADM

## 2018-01-12 RX ORDER — MIDAZOLAM HYDROCHLORIDE 1 MG/ML
.25-1 INJECTION, SOLUTION INTRAMUSCULAR; INTRAVENOUS
Status: DISCONTINUED | OUTPATIENT
Start: 2018-01-12 | End: 2018-01-12 | Stop reason: HOSPADM

## 2018-01-12 RX ORDER — ATROPINE SULFATE 1 MG/ML
1 INJECTION, SOLUTION INTRAVENOUS ONCE
Status: DISCONTINUED | OUTPATIENT
Start: 2018-01-12 | End: 2018-01-12 | Stop reason: HOSPADM

## 2018-01-12 RX ORDER — FLUMAZENIL 0.1 MG/ML
0.2 INJECTION INTRAVENOUS
Status: DISCONTINUED | OUTPATIENT
Start: 2018-01-12 | End: 2018-01-12 | Stop reason: HOSPADM

## 2018-01-12 RX ORDER — NALOXONE HYDROCHLORIDE 0.4 MG/ML
0.4 INJECTION, SOLUTION INTRAMUSCULAR; INTRAVENOUS; SUBCUTANEOUS
Status: DISCONTINUED | OUTPATIENT
Start: 2018-01-12 | End: 2018-01-12 | Stop reason: HOSPADM

## 2018-01-12 RX ORDER — ATROPINE SULFATE 0.1 MG/ML
0.5 INJECTION INTRAVENOUS
Status: DISCONTINUED | OUTPATIENT
Start: 2018-01-12 | End: 2018-01-12 | Stop reason: HOSPADM

## 2018-01-12 RX ORDER — EPINEPHRINE 0.1 MG/ML
1 INJECTION INTRACARDIAC; INTRAVENOUS
Status: DISCONTINUED | OUTPATIENT
Start: 2018-01-12 | End: 2018-01-12 | Stop reason: HOSPADM

## 2018-01-12 RX ORDER — SODIUM CHLORIDE 0.9 % (FLUSH) 0.9 %
5-10 SYRINGE (ML) INJECTION AS NEEDED
Status: ACTIVE | OUTPATIENT
Start: 2018-01-12 | End: 2018-01-12

## 2018-01-12 RX ORDER — DEXTROMETHORPHAN/PSEUDOEPHED 2.5-7.5/.8
1.2 DROPS ORAL
Status: DISCONTINUED | OUTPATIENT
Start: 2018-01-12 | End: 2018-01-12 | Stop reason: HOSPADM

## 2018-01-12 RX ORDER — DIPHENHYDRAMINE HYDROCHLORIDE 50 MG/ML
50 INJECTION, SOLUTION INTRAMUSCULAR; INTRAVENOUS ONCE
Status: DISCONTINUED | OUTPATIENT
Start: 2018-01-12 | End: 2018-01-12 | Stop reason: HOSPADM

## 2018-01-12 RX ORDER — PROPOFOL 10 MG/ML
INJECTION, EMULSION INTRAVENOUS AS NEEDED
Status: DISCONTINUED | OUTPATIENT
Start: 2018-01-12 | End: 2018-01-12 | Stop reason: HOSPADM

## 2018-01-12 RX ADMIN — FAMOTIDINE 20 MG: 10 INJECTION, SOLUTION INTRAVENOUS at 20:31

## 2018-01-12 RX ADMIN — Medication 5 ML: at 05:10

## 2018-01-12 RX ADMIN — PROPOFOL 120 MG: 10 INJECTION, EMULSION INTRAVENOUS at 14:57

## 2018-01-12 RX ADMIN — LEVOFLOXACIN 750 MG: 5 INJECTION, SOLUTION INTRAVENOUS at 23:08

## 2018-01-12 RX ADMIN — PROPOFOL 50 MG: 10 INJECTION, EMULSION INTRAVENOUS at 15:04

## 2018-01-12 RX ADMIN — PROPOFOL 50 MG: 10 INJECTION, EMULSION INTRAVENOUS at 15:02

## 2018-01-12 RX ADMIN — SODIUM CHLORIDE: 9 INJECTION, SOLUTION INTRAVENOUS at 14:18

## 2018-01-12 RX ADMIN — PROPOFOL 30 MG: 10 INJECTION, EMULSION INTRAVENOUS at 15:00

## 2018-01-12 RX ADMIN — HYDROMORPHONE HYDROCHLORIDE 0.5 MG: 2 INJECTION, SOLUTION INTRAMUSCULAR; INTRAVENOUS; SUBCUTANEOUS at 00:50

## 2018-01-12 RX ADMIN — Medication 10 ML: at 20:32

## 2018-01-12 RX ADMIN — HYDROMORPHONE HYDROCHLORIDE 0.5 MG: 2 INJECTION, SOLUTION INTRAMUSCULAR; INTRAVENOUS; SUBCUTANEOUS at 04:51

## 2018-01-12 RX ADMIN — PROPOFOL 50 MG: 10 INJECTION, EMULSION INTRAVENOUS at 15:08

## 2018-01-12 RX ADMIN — FOLIC ACID: 5 INJECTION, SOLUTION INTRAMUSCULAR; INTRAVENOUS; SUBCUTANEOUS at 09:14

## 2018-01-12 RX ADMIN — Medication 10 ML: at 17:23

## 2018-01-12 RX ADMIN — Medication 10 ML: at 20:55

## 2018-01-12 RX ADMIN — FAMOTIDINE 20 MG: 10 INJECTION, SOLUTION INTRAVENOUS at 09:09

## 2018-01-12 RX ADMIN — Medication 10 ML: at 17:00

## 2018-01-12 NOTE — PROGRESS NOTES
TRANSFER - IN REPORT:    Verbal report received from [facility] RN(name) on Ephriam Henley  being received from Endo(unit) for routine progression of care      Report consisted of patients Situation, Background, Assessment and   Recommendations(SBAR). Information from the following report(s) Procedure Summary and Cardiac Rhythm sinus Abhi Osborn was reviewed with the receiving nurse. Opportunity for questions and clarification was provided. Assessment completed upon patients arrival to unit and care assumed.          \

## 2018-01-12 NOTE — ROUTINE PROCESS
Bedside shift change report given to BABS Aguila (oncoming nurse) by Esther Dover RN (offgoing nurse). Report included the following information SBAR, ED Summary, Intake/Output, Recent Results, Med Rec Status and Cardiac Rhythm NSR.

## 2018-01-12 NOTE — PROGRESS NOTES
TRANSFER - OUT REPORT:    Verbal report given to Jeanmarie Owens RN (name) on Marleni Osamn  being transferred to [facility](unit) for routine post - op       Report consisted of patients Situation, Background, Assessment and   Recommendations(SBAR). Information from the following report(s) SBAR, Procedure Summary and Recent Results was reviewed with the receiving nurse. Lines:   Peripheral IV 01/09/18 Right Antecubital (Active)   Site Assessment Clean, dry, & intact 1/12/2018  8:23 AM   Phlebitis Assessment 0 1/12/2018  8:23 AM   Infiltration Assessment 0 1/12/2018  8:23 AM   Dressing Status Clean, dry, & intact 1/12/2018  8:23 AM   Dressing Type Transparent 1/12/2018  8:23 AM   Hub Color/Line Status Pink;Capped 1/12/2018  8:23 AM   Action Taken Open ports on tubing capped 1/12/2018  3:17 AM   Alcohol Cap Used Yes 1/12/2018  8:23 AM       Peripheral IV 01/10/18 Right Forearm (Active)   Site Assessment Clean, dry, & intact 1/12/2018  8:23 AM   Phlebitis Assessment 0 1/12/2018  8:23 AM   Infiltration Assessment 0 1/12/2018  8:23 AM   Dressing Status Clean, dry, & intact 1/12/2018  8:23 AM   Dressing Type Transparent 1/12/2018  8:23 AM   Hub Color/Line Status Pink; Infusing 1/12/2018  8:23 AM   Action Taken Open ports on tubing capped 1/12/2018  3:17 AM   Alcohol Cap Used Yes 1/12/2018  8:23 AM        Opportunity for questions and clarification was provided.       Patient transported with: [facility]

## 2018-01-12 NOTE — ROUTINE PROCESS
Zhen Kent  1955  [id number]    Situation:  Verbal report received from: RN Katelin Robledo  Procedure: Procedure(s):  ENDOSCOPIC ULTRASOUND (EUS)  ENDOSCOPIC RETROGRADE CHOLANGIOPANCREATOGRAPHY  ESOPHAGOGASTRODUODENOSCOPY (EGD)    Background:    Preoperative diagnosis: Pancreatitis  Postoperative diagnosis: 1.- Gallbladder Sludge  2.- Duodenal Diverticulum  3.- Acute Pancreatitis    :  Dr. Marlon Garcia  Assistant(s): Endoscopy Technician-1: Catrachito Pro  Endoscopy RN-1: Edwin Aldrich RN    Specimens: * No specimens in log *  H. Pylori  no    Assessment:  Intra-procedure medications       Anesthesia gave intra-procedure sedation and medications, see anesthesia flow sheet yes    Intravenous fluids:   250  NS @ KVO     Vital signs stable yes    Abdominal assessment: round and soft yes    Recommendation:  Discharge patient per MD order NO.   Return to floor YES  Family or Friend : wife  Permission to share finding with family or friend yes

## 2018-01-12 NOTE — ANESTHESIA PREPROCEDURE EVALUATION
Anesthetic History   No history of anesthetic complications            Review of Systems / Medical History  Patient summary reviewed, nursing notes reviewed and pertinent labs reviewed    Pulmonary  Within defined limits                 Neuro/Psych   Within defined limits           Cardiovascular                  Exercise tolerance: >4 METS     GI/Hepatic/Renal               Comments: Common bile duct obstruction  pancreatitis Endo/Other  Within defined limits           Other Findings            Physical Exam    Airway  Mallampati: II  TM Distance: > 6 cm  Neck ROM: normal range of motion   Mouth opening: Normal     Cardiovascular    Rhythm: regular  Rate: normal         Dental  No notable dental hx       Pulmonary  Breath sounds clear to auscultation               Abdominal         Other Findings            Anesthetic Plan    ASA: 3  Anesthesia type: general and MAC          Induction: Intravenous  Anesthetic plan and risks discussed with: Patient

## 2018-01-12 NOTE — PROGRESS NOTES

## 2018-01-12 NOTE — PROCEDURES
[address], [address] [place] [address]         Endoscopic Ultrasound    NAME:  Reza Leyva   :   1955   MRN:   [id number]       Procedure Type: Endoscopic Ultrasound    Indications: acute pancreatitis, suspect gallstone etiology, bacteremia, negative MRCP    Pre-operative Diagnosis: see indication above    Post-operative Diagnosis:  See findings below    : Bryant Hilton MD    Referring Provider: -GLORIA Prado MD, -Priyank Gonzales MD    Anethesia/Sedation:  MAC anesthesia Propofol      Procedure Details     After informed consent was obtained for the procedure, with all risks and benefits of procedure explained the patient was taken to the endoscopy suite and placed in the left lateral decubitus position. Following sequential administration of sedation as per above, an EGD was performed. Findings are listed below. Next, the linear echoendoscope was inserted into the mouth and advanced under direct vision to the second portion of the duodenum. Findings:     Endoscopic:   Esophagus:normal   Stomach: normal    Duodenum: spontaneous biliary drainage was seen. The ampulla could not be visualized with both gastroscope and linear echoendoscope. A large duodenal diverticulum was seen. The ampulla was likely hidden within the diverticulum. Ultrasound:   Esophagus: normal   Stomach: normal   Pancreas:     Areas examined: the entire gland    Parenchyma: heterogeneous appearing parenchyma throughout consistent with acute pancreatitis. Main pancreatic duct was not well visualized. In the body of the pancreas, there were two anechoic cystic structures each approximately 3 mm in diameter. These did not communicate with the main pancreatic duct.          Pancreatic Duct: as above   Liver:     Parenchyma: visualized portions were normal appearing   Gallbladder: gallbladder was non-distended, relatively thickened wall, there were hyperechoic foci consistent with small stones/sludge   Bile Duct: maximum diameter of 5.4 mm, no wall thickening, no sludge or stones seen   Lymph Node: no pathological lymphadenopathy seen         Specimen Removed: none    Complications: None. EBL:  None. Interventions: see above    Impression:  1. Non-dilated bile duct with no stones or sludge seen. Spontaneous biliary drainage was seen. 2. Non-distended gallbladder, but with relative wall thickening and positive for small stones/sludge  3. Large duodenal diverticulum  4. Ampulla was not visualized and is likely hidden within large diverticulum  5. Heterogeneous appearing pancreatic parenchyma in setting of acute pancreatitis  6. Two small cystic structures in the body of the pancreas - most likely IPMN's vs sequelae of acute pancreatitis    Recommendations:   1. No indication for ERCP at this time  2. Surgical consultation for cholecystectomy (small stones/sludge seen on EUS today)  3. Continue antibiotics for bacteremia  4. Advance diet to clear liquids  5. Weekend team to follow  6. Office follow up after completion of antibiotic course and cholecystectomy    Signed By: Barb Mojica.  Gretchen Gaviria MD     1/12/2018  3:30 PM

## 2018-01-13 VITALS
DIASTOLIC BLOOD PRESSURE: 82 MMHG | SYSTOLIC BLOOD PRESSURE: 150 MMHG | WEIGHT: 200.18 LBS | HEART RATE: 51 BPM | TEMPERATURE: 98.2 F | BODY MASS INDEX: 27.11 KG/M2 | RESPIRATION RATE: 16 BRPM | HEIGHT: 72 IN | OXYGEN SATURATION: 97 %

## 2018-01-13 PROBLEM — K85.90 PANCREATITIS: Status: RESOLVED | Noted: 2018-01-09 | Resolved: 2018-01-13

## 2018-01-13 LAB
ALBUMIN SERPL-MCNC: 3.1 G/DL (ref 3.5–5)
ALBUMIN/GLOB SERPL: 0.8 {RATIO} (ref 1.1–2.2)
ALP SERPL-CCNC: 73 U/L (ref 45–117)
ALT SERPL-CCNC: 54 U/L (ref 12–78)
ANION GAP SERPL CALC-SCNC: 6 MMOL/L (ref 5–15)
AST SERPL-CCNC: 36 U/L (ref 15–37)
BILIRUB SERPL-MCNC: 0.4 MG/DL (ref 0.2–1)
BUN SERPL-MCNC: 9 MG/DL (ref 6–20)
BUN/CREAT SERPL: 9 (ref 12–20)
CALCIUM SERPL-MCNC: 9.2 MG/DL (ref 8.5–10.1)
CHLORIDE SERPL-SCNC: 107 MMOL/L (ref 97–108)
CK SERPL-CCNC: 164 U/L (ref 39–308)
CO2 SERPL-SCNC: 28 MMOL/L (ref 21–32)
CREAT SERPL-MCNC: 1.03 MG/DL (ref 0.7–1.3)
ERYTHROCYTE [DISTWIDTH] IN BLOOD BY AUTOMATED COUNT: 12.8 % (ref 11.5–14.5)
GLOBULIN SER CALC-MCNC: 3.8 G/DL (ref 2–4)
GLUCOSE BLD STRIP.AUTO-MCNC: 122 MG/DL (ref 65–100)
GLUCOSE BLD STRIP.AUTO-MCNC: 96 MG/DL (ref 65–100)
GLUCOSE SERPL-MCNC: 96 MG/DL (ref 65–100)
HCT VFR BLD AUTO: 40 % (ref 36.6–50.3)
HGB BLD-MCNC: 13.7 G/DL (ref 12.1–17)
LIPASE SERPL-CCNC: 177 U/L (ref 73–393)
MCH RBC QN AUTO: 30.6 PG (ref 26–34)
MCHC RBC AUTO-ENTMCNC: 34.3 G/DL (ref 30–36.5)
MCV RBC AUTO: 89.5 FL (ref 80–99)
PLATELET # BLD AUTO: 173 K/UL (ref 150–400)
POTASSIUM SERPL-SCNC: 3.9 MMOL/L (ref 3.5–5.1)
PROT SERPL-MCNC: 6.9 G/DL (ref 6.4–8.2)
RBC # BLD AUTO: 4.47 M/UL (ref 4.1–5.7)
SERVICE CMNT-IMP: ABNORMAL
SERVICE CMNT-IMP: NORMAL
SODIUM SERPL-SCNC: 141 MMOL/L (ref 136–145)
WBC # BLD AUTO: 4.6 K/UL (ref 4.1–11.1)

## 2018-01-13 PROCEDURE — 82962 GLUCOSE BLOOD TEST: CPT

## 2018-01-13 PROCEDURE — 36415 COLL VENOUS BLD VENIPUNCTURE: CPT | Performed by: FAMILY MEDICINE

## 2018-01-13 PROCEDURE — 82550 ASSAY OF CK (CPK): CPT | Performed by: FAMILY MEDICINE

## 2018-01-13 PROCEDURE — 74011250637 HC RX REV CODE- 250/637: Performed by: NURSE PRACTITIONER

## 2018-01-13 PROCEDURE — 74011000250 HC RX REV CODE- 250: Performed by: FAMILY MEDICINE

## 2018-01-13 PROCEDURE — 74011250636 HC RX REV CODE- 250/636: Performed by: NURSE PRACTITIONER

## 2018-01-13 PROCEDURE — 80053 COMPREHEN METABOLIC PANEL: CPT | Performed by: FAMILY MEDICINE

## 2018-01-13 PROCEDURE — 85027 COMPLETE CBC AUTOMATED: CPT | Performed by: FAMILY MEDICINE

## 2018-01-13 PROCEDURE — 83690 ASSAY OF LIPASE: CPT | Performed by: FAMILY MEDICINE

## 2018-01-13 RX ORDER — LEVOFLOXACIN 750 MG/1
750 TABLET ORAL DAILY
Qty: 10 TAB | Refills: 0 | Status: SHIPPED | OUTPATIENT
Start: 2018-01-13 | End: 2018-01-23 | Stop reason: ALTCHOICE

## 2018-01-13 RX ADMIN — SODIUM CHLORIDE, SODIUM LACTATE, POTASSIUM CHLORIDE, AND CALCIUM CHLORIDE 150 ML/HR: 600; 310; 30; 20 INJECTION, SOLUTION INTRAVENOUS at 09:22

## 2018-01-13 RX ADMIN — BUTALBITAL, ACETAMINOPHEN AND CAFFEINE 1 TABLET: 50; 325; 40 TABLET ORAL at 12:14

## 2018-01-13 RX ADMIN — SODIUM CHLORIDE, SODIUM LACTATE, POTASSIUM CHLORIDE, AND CALCIUM CHLORIDE 150 ML/HR: 600; 310; 30; 20 INJECTION, SOLUTION INTRAVENOUS at 02:34

## 2018-01-13 RX ADMIN — FAMOTIDINE 20 MG: 10 INJECTION, SOLUTION INTRAVENOUS at 09:11

## 2018-01-13 NOTE — DISCHARGE INSTRUCTIONS
Discharge Instructions     PATIENT ID: Stewart Jc  MRN: [id number]   YOB: 1955    DATE OF ADMISSION: 1/9/2018  4:11 PM    DATE OF DISCHARGE: 1/13/2018  PRIMARY CARE PROVIDER: Veronica Salvador MD   ATTENDING PHYSICIAN: Jose Cruz MD  DISCHARGING PROVIDER: Daniel Clinton NP. To contact this individual call [phone] and ask the  to page. If unavailable ask to be transferred the Adult Hospitalist Department. DISCHARGE DIAGNOSES   Pancreatitis  Gallstones with normal common bile duct  Bacteremia    CONSULTATIONS: IP CONSULT TO HOSPITALIST  IP CONSULT TO GASTROENTEROLOGY  IP CONSULT TO GENERAL SURGERY    PROCEDURES/SURGERIES: Procedure(s):  ENDOSCOPIC ULTRASOUND (EUS)  ENDOSCOPIC RETROGRADE CHOLANGIOPANCREATOGRAPHY  ESOPHAGOGASTRODUODENOSCOPY (EGD)    FOLLOW UP APPOINTMENTS:   Follow-up Information     Follow up With Details Comments Contact Info    Veronica Salvador MD  As needed [address] [facility]  [address] [place] [address]      Gabrielle Espinoza MD  call to make appointment [address] [place] [address]. Kahlil Stauffer MD In 4 weeks follow up after antibiotics completion and cholecystectomy [address] [facility]  [phone]          ADDITIONAL CARE RECOMMENDATIONS:   Take full course of antibiotics (have 10 days remaining, starting this evening 1/13)    DIET: Low fat, Low cholesterol  ACTIVITY: Activity as tolerated    · It is important that you take the medication exactly as they are prescribed. · Keep your medication in the bottles provided by the pharmacist and keep a list of the medication names, dosages, and times to be taken in your wallet. · Do not take other medications without consulting your doctor. NOTIFY YOUR PHYSICIAN FOR ANY OF THE FOLLOWING:   Fever over 101 degrees for 24 hours.    Chest pain, shortness of breath, fever, chills, nausea, vomiting, diarrhea, change in mentation, falling, weakness, bleeding. Severe pain or pain not relieved by medications. Or, any other signs or symptoms that you may have questions about. DISPOSITION:    Home With:   OT  PT  HH  RN       SNF/Inpatient Rehab/LTAC    Independent/assisted living    Hospice   xx Other: Home     CDMP Checked:   Yes *x*     PROBLEM LIST Updated:  Yes *x*     Signed:   Haven Tan NP  1/13/2018  10:51 AM     Probiotic (Acidophilus Probiotic Blend, Culturelle, Adult Probiotic, Bifidonate) - (By mouth)   Why this medicine is used: May increase the number of healthy bacteria in your stomach and intestines. Common side effects:  · Mild diarrhea, constipation, nausea, vomiting  · Mild gas or cramps  © 2017 [employer] Information is for End User's use only and may not be sold, redistributed or otherwise used for commercial purposes. Levofloxacin (Levaquin, Levaquin Leva-merrill) - (By mouth)   Why this medicine is used:   Treats infections. Contact a nurse or doctor right away if you have:  · Blistering, peeling, red skin rash  · Fast, slow, or uneven heartbeat; lightheadedness or fainting  · Dark urine or pale stools, loss of appetite, stomach pain, yellow skin or eyes  · Severe or bloody diarrhea  · Pain, stiffness, swelling, or bruises around your ankle, leg, shoulder, or other joint     Common side effects:  · Mild nausea, vomiting, diarrhea  · Mild headache  © 2017 [employer] Information is for End User's use only and may not be sold, redistributed or otherwise used for commercial purposes.

## 2018-01-13 NOTE — PROGRESS NOTES
Problem: Pancreatitis  Goal: *Control of acute pain  Outcome: Progressing Towards Goal  Patient denies any pain; with a pain scale of 0/10.

## 2018-01-13 NOTE — PROGRESS NOTES
TRANSFER - IN REPORT:    Verbal report received from [address] (name) on Chico Mohan  being received from [facility] (unit) for routine progression of care      Report consisted of patients Situation, Background, Assessment and   Recommendations(SBAR). Information from the following report(s) SBAR, Kardex, Intake/Output, MAR, Recent Results and Med Rec Status was reviewed with the receiving nurse. Opportunity for questions and clarification was provided. Assessment completed upon patients arrival to unit and care assumed.

## 2018-01-13 NOTE — PROGRESS NOTES
Received verbal SBAR report from Moshe shelley RN from [facility] regarding patient going to room 604A. Notified Moshe shelley that room was not clean and environmental services was notified to STAT clean on this previous isolation room. Charge nurse Christopher Monson RN made aware.

## 2018-01-13 NOTE — DISCHARGE SUMMARY
Discharge Summary     PATIENT ID: Ephraim Henley  MRN: [id number]   YOB: 1955    DATE OF ADMISSION: 1/9/2018  4:11 PM    DATE OF DISCHARGE: 1/13/2018  PRIMARY CARE PROVIDER: Raymond Savage MD   ATTENDING PHYSICIAN: Amber Headley MD  DISCHARGING PROVIDER: Alfred Mitchell NP. To contact this individual call [phone] and ask the  to page. If unavailable ask to be transferred the Adult Hospitalist Department. CONSULTATIONS: IP CONSULT TO HOSPITALIST  IP CONSULT TO GASTROENTEROLOGY  IP CONSULT TO GENERAL SURGERY    PROCEDURES/SURGERIES: Procedure(s):  ENDOSCOPIC ULTRASOUND (EUS)  ENDOSCOPIC RETROGRADE CHOLANGIOPANCREATOGRAPHY  ESOPHAGOGASTRODUODENOSCOPY (EGD)    ADMITTING DIAGNOSES & HOSPITAL COURSE:   Pt was referred to the ED due to unrelenting abdominal pain.  The patient reports indicated he has been having some abdominal pain on/off for about a year. Has been attributing it to acid reflux, but in the last 2-3 days, he started experiencing significant abdominal pain. He saw Dr. Analy Gallegos in the office the day of admit  this morning, had some blood work done and was told to take Zantac, but continued to have exceptional pain. When his called the office again and was told to come to the ER. The patient was found to have an acute pancreatitis in the ED Denies ever having pancreatitis, gallbladder issues or liver issues in the past.  His wife reports that he drinks three beers on a daily basis and on weekends probably adds three glasses of wine to that. Admits to some nausea but denies any vomiting. The pts reported chills the am of admit.      DISCHARGE DIAGNOSES / PLAN:      Acute pancreatitis (POA):    - IV hydration, pain control prn  - CT abdomen: edema in the head of the pancreas with surrounding inflammatory stranding extending into the pancreatic groove. A duodenal diverticulum is again seen from the third portion of the duodenum.  This appears less distended than previously seen and slightly irregular. There is no wall thickening in the diverticulum or the adjacent duodenum. Findings are more likely related to acute pancreatitis than diverticulitis  - Abdominal Ultrasound: No cholelithiasis. No evidence of cholecystitis. Findings consistent with pancreatic head pancreatitis. Distended CBD. No choledocholithiasis is identified  - pain control: pt reports morphine did not help with pain in the ED - switched back to dilaudid but at a lower dose. - MRCP 1/10: acute pancreatitis. No drainable fluid collection. No pancreatic necrosis or  Divisum. Duodenal diverticulum arises from the third portion and extends toward the  pancreatic head. Equivocal evidence of early or resolving cholecystitis. No biliary  obstruction or choledocholithiasis  - GI feels pt likely a passed gallstone   - lipase significantly improved and pt reports no abdominal pain  - EUS 1/13: normal CBD/small stones noted in gallbladder  - Surgery was consulted and do not think emergent surgery is indicated - can be done outpatient. They are planning on calling [facility] to do surgery. - tolerating full liquid diet, advancing this am and may discharge after if tolerates     Gram Negative Bacteremia, E coli (POA):   - admit BC GNR from two different sites. Ecoli, sensitive to levaquin  - Stopped flagyl 1/11.  - repeat BC with no growth thus far (3 days)  - UA negative. Abdominal source likely.   - discharge with 10 more days of levaquin to complete 14 day course  - added probiotic     Transaminitis and mild hyperbilirubinemia: unclear etiology.    - resolved with fluids and NPO     Hx of alcohol use:    - no s/s withdrawal  - counseled to quit     Hx of prostate cancer: hx prostatectomy     Hx of GERD: pepcid and protinix     Headache: mild HA this am, has tylenol     FOLLOW UP APPOINTMENTS:    Follow-up Information     Follow up With Details Comments Contact Info    Maren Allison MD  As needed [address] [address]  [phone]      Suma Pyle MD  call to make appointment [address] [facility] [place]  109 [facility]. Melina Duran MD In 4 weeks follow up after antibiotics completion and cholecystectomy [address]  [phone]           ADDITIONAL CARE RECOMMENDATIONS:   Take full course of antibiotics (have 10 days remaining, starting this evening 1/13)    DIET: Low fat, Low cholesterol  ACTIVITY: Activity as tolerated    DISCHARGE MEDICATIONS:  Current Discharge Medication List      START taking these medications    Details   levoFLOXacin (LEVAQUIN) 750 mg tablet Take 1 Tab by mouth daily. Qty: 10 Tab, Refills: 0      L. acidoph & paracasei- S therm- Bifido (SHIRA-Q/RISAQUAD) 8 billion cell cap cap Take 1 Cap by mouth daily. Qty: 14 Cap, Refills: 0         CONTINUE these medications which have NOT CHANGED    Details   pantoprazole (PROTONIX) 20 mg tablet Take  by mouth daily. famotidine (PEPCID) 10 mg tablet Take  by mouth two (2) times a day. aluminum hydrox-magnesium carb (GAVISCON)  mg/15 mL suspension Take 15 mL by mouth as needed for Indigestion. NOTIFY YOUR PHYSICIAN FOR ANY OF THE FOLLOWING:   Fever over 101 degrees for 24 hours. Chest pain, shortness of breath, fever, chills, nausea, vomiting, diarrhea, change in mentation, falling, weakness, bleeding. Severe pain or pain not relieved by medications. Or, any other signs or symptoms that you may have questions about.     DISPOSITION:    Home With:   OT  PT  HH  RN       Long term SNF/Inpatient Rehab    Independent/assisted living    Hospice   xx Other: Home     PATIENT CONDITION AT DISCHARGE:   Functional status    Poor     Deconditioned    xx Independent      Cognition    xx Lucid     Forgetful     Dementia      Catheters/lines (plus indication)    Carbajal     PICC     PEG    xx None      Code status    xx Full code     DNR PHYSICAL EXAMINATION AT DISCHARGE:  /82 (BP 1 Location: Left arm, BP Patient Position: At rest)  Pulse (!) 51  Temp 98.2 °F (36.8 °C)  Resp 16  Ht 6' (1.829 m)  Wt 90.8 kg (200 lb 2.8 oz)  SpO2 97%  BMI 27.15 kg/m2    Pt in bed, wife at bedside. No new complaints other than getting hot and sweaty last night accompanied by a mild headache and mild epigastric discomfort. This has resolved. Labs this am are ok. Discussed plan of care including discharge plans - he is eager to go home. Encouraged him to come back to the ED if he spikes a fever. Pt to follow up with Dr. Sherry Ivory and John William outpatient    Constitutional:  No acute distress, cooperative, pleasant.    ENT:  Oral mucous membranes moist.    Resp:  CTA bilaterally. No wheezing/rales. CV:  Regular rhythm, normal rate    GI:  Soft, non distended. Normoactive bowel sounds + gas    Musculoskeletal:  No edema, warm, 2+ pulses throughout    Neurologic:  Moves all extremities.   AAOx3       CHRONIC MEDICAL DIAGNOSES:  Problem List as of 1/13/2018  Date Reviewed: 1/13/2018          Codes Class Noted - Resolved    * (Principal)RESOLVED: Pancreatitis ICD-10-CM: K85.90  ICD-9-CM: 066.9  1/9/2018 - 1/13/2018            Greater than 30 minutes were spent with the patient on counseling and coordination of care    Signed:   Geraldo Kamara NP  1/13/2018  10:58 AM

## 2018-01-13 NOTE — PROGRESS NOTES
Progress Note    Patient: Stacey Fritz MRN: [id number]  SSN: [id number]    YOB: 1955  Age: 57 y.o. Sex: male      Admit Date: 2018    1 Day Post-Op    Procedure:  Procedure(s):  ENDOSCOPIC ULTRASOUND (EUS)  ENDOSCOPIC RETROGRADE CHOLANGIOPANCREATOGRAPHY  ESOPHAGOGASTRODUODENOSCOPY (EGD)    Subjective:     No acute surgical issues. Pt tolerating full liquids without nausea or vomiting. Pt denied any abdominal pain    Objective:     Visit Vitals    /82 (BP 1 Location: Left arm, BP Patient Position: At rest)    Pulse (!) 51    Temp 98.2 °F (36.8 °C)    Resp 16    Ht 6' (1.829 m)    Wt 200 lb 2.8 oz (90.8 kg)    SpO2 97%    BMI 27.15 kg/m2       Temp (24hrs), Av °F (36.7 °C), Min:97.3 °F (36.3 °C), Max:98.9 °F (37.2 °C)        Physical Exam:    Gen:  NAD  Pulm:  Unlabored  Abd:  S/ND/appropriate TTP  Wound:  C/D/I    Recent Results (from the past 24 hour(s))   CK    Collection Time: 18  5:50 AM   Result Value Ref Range     39 - 029 U/L   METABOLIC PANEL, COMPREHENSIVE    Collection Time: 18  5:50 AM   Result Value Ref Range    Sodium 141 136 - 145 mmol/L    Potassium 3.9 3.5 - 5.1 mmol/L    Chloride 107 97 - 108 mmol/L    CO2 28 21 - 32 mmol/L    Anion gap 6 5 - 15 mmol/L    Glucose 96 65 - 100 mg/dL    BUN 9 6 - 20 MG/DL    Creatinine 1.03 0.70 - 1.30 MG/DL    BUN/Creatinine ratio 9 (L) 12 - 20      GFR est AA >60 >60 ml/min/1.73m2    GFR est non-AA >60 >60 ml/min/1.73m2    Calcium 9.2 8.5 - 10.1 MG/DL    Bilirubin, total 0.4 0.2 - 1.0 MG/DL    ALT (SGPT) 54 12 - 78 U/L    AST (SGOT) 36 15 - 37 U/L    Alk.  phosphatase 73 45 - 117 U/L    Protein, total 6.9 6.4 - 8.2 g/dL    Albumin 3.1 (L) 3.5 - 5.0 g/dL    Globulin 3.8 2.0 - 4.0 g/dL    A-G Ratio 0.8 (L) 1.1 - 2.2     CBC W/O DIFF    Collection Time: 18  5:50 AM   Result Value Ref Range    WBC 4.6 4.1 - 11.1 K/uL    RBC 4.47 4.10 - 5.70 M/uL    HGB 13.7 12.1 - 17.0 g/dL    HCT 40.0 36.6 - 50.3 %    MCV 89.5 80.0 - 99.0 FL    MCH 30.6 26.0 - 34.0 PG    MCHC 34.3 30.0 - 36.5 g/dL    RDW 12.8 11.5 - 14.5 %    PLATELET 529 630 - 525 K/uL   LIPASE    Collection Time: 01/13/18  5:50 AM   Result Value Ref Range    Lipase 177 73 - 393 U/L   GLUCOSE, POC    Collection Time: 01/13/18  6:26 AM   Result Value Ref Range    Glucose (POC) 96 65 - 100 mg/dL    Performed by Darlin Noguera          Assessment:     Hospital Problems  Date Reviewed: 1/9/2018          Codes Class Noted POA    * (Principal)Pancreatitis ICD-10-CM: K85.90  ICD-9-CM: 623.9  1/9/2018 Unknown              Plan/Recommendations/Medical Decision Making:     - Advance to GI lite diet  - Possible gallstone pancreatitis:  Discussed with patient about operation prior to discharge versus elective outpatient cholecystectomy in 2 weeks.   Pt is leaning towards discharge soon and outpatient surgery  - Continue antibiotic  - Pain control    Signed By: Cecile Wright MD     January 13, 2018

## 2018-01-13 NOTE — CONSULTS
Chief Complaint:  Pancreatitis    HPI:  56 yo man with hx prostate cancer s/p prostatectomy, diverticulitis, GERD consulted for biliary sludge. Pt was admitted for pancreatis confirmed by CT scan, abdominal US and MRCP. There was no evidence of cholelithiasis, cholecystitis or biliary obstruction on these studies. Pt had EUS by Dr. Lashawn Almaguer which showed sludge in gallbladder. Pt reported severe upper abdominal pain with nausea and vomiting when he presented to ED but has now resolved. Pt denied any pain, nausea or vomiting. No leukocytosis or elevated liver or pancreatic enzymes at this time. Past Medical History:   Diagnosis Date    Prostate CA [facility])        Past Surgical History:   Procedure Laterality Date    HX PROSTATECTOMY         No current facility-administered medications on file prior to encounter. No current outpatient prescriptions on file prior to encounter.        No Known Allergies    Review of Systems - General ROS: negative  Psychological ROS: negative  Respiratory ROS: negative  Cardiovascular ROS: negative  Gastrointestinal ROS: positive for - abdominal pain and nausea/vomiting    Visit Vitals    /77 (BP 1 Location: Left arm, BP Patient Position: At rest)    Pulse (!) 54    Temp 97.8 °F (36.6 °C)    Resp 17    Ht 6' (1.829 m)    Wt 200 lb 2.8 oz (90.8 kg)    SpO2 96%    BMI 27.15 kg/m2         Physical Exam:    Gen:  NAD  Pulm:  Unlabored  Abd:  S/ND/Non-TTP     Recent Results (from the past 24 hour(s))   CK    Collection Time: 01/12/18  3:25 AM   Result Value Ref Range     39 - 308 U/L   CK    Collection Time: 01/12/18  3:25 AM   Result Value Ref Range     39 - 308 U/L   CBC W/O DIFF    Collection Time: 01/12/18  3:25 AM   Result Value Ref Range    WBC 7.7 4.1 - 11.1 K/uL    RBC 3.79 (L) 4.10 - 5.70 M/uL    HGB 11.5 (L) 12.1 - 17.0 g/dL    HCT 34.6 (L) 36.6 - 50.3 %    MCV 91.3 80.0 - 99.0 FL    MCH 30.3 26.0 - 34.0 PG    MCHC 33.2 30.0 - 36.5 g/dL    RDW 12.7 11.5 - 14.5 %    PLATELET 037 (L) 462 - 400 K/uL   LIPASE    Collection Time: 01/12/18  3:25 AM   Result Value Ref Range    Lipase 247 73 - 210 U/L   METABOLIC PANEL, COMPREHENSIVE    Collection Time: 01/12/18  3:25 AM   Result Value Ref Range    Sodium 138 136 - 145 mmol/L    Potassium 3.6 3.5 - 5.1 mmol/L    Chloride 107 97 - 108 mmol/L    CO2 24 21 - 32 mmol/L    Anion gap 7 5 - 15 mmol/L    Glucose 73 65 - 100 mg/dL    BUN 14 6 - 20 MG/DL    Creatinine 0.92 0.70 - 1.30 MG/DL    BUN/Creatinine ratio 15 12 - 20      GFR est AA >60 >60 ml/min/1.73m2    GFR est non-AA >60 >60 ml/min/1.73m2    Calcium 8.3 (L) 8.5 - 10.1 MG/DL    Bilirubin, total 0.5 0.2 - 1.0 MG/DL    ALT (SGPT) 55 12 - 78 U/L    AST (SGOT) 35 15 - 37 U/L    Alk. phosphatase 58 45 - 117 U/L    Protein, total 6.2 (L) 6.4 - 8.2 g/dL    Albumin 2.9 (L) 3.5 - 5.0 g/dL    Globulin 3.3 2.0 - 4.0 g/dL    A-G Ratio 0.9 (L) 1.1 - 2.2         AP:  56 yo man with pancreatitis consulted for gallbladder sludge noted on EUS    - Gallbladder sludge:  Unclear if this is source of pancreatitis.   Pt does have large duodenal diverticulum  - As this is not clear cut gallstone pancreatitis and pt is asymptomatic, recommend holding off on cholecystectomy until pancreatic inflammation has resolved  - Advance diet as tolerated  - Follow-up if pt is discharged, can follow-up with [facility] clinic to discuss elective outpatient laparoscopic cholecystectomy in next several weeks

## 2018-01-13 NOTE — PROGRESS NOTES
.Primary Nurse Therese Amos RN and Hollie Aase, RN performed a dual skin assessment on this patient No impairment noted  Kenny score is 22

## 2018-01-13 NOTE — PROGRESS NOTES
Problem: Falls - Risk of  Goal: *Absence of Falls  Document Moi Fall Risk and appropriate interventions in the flowsheet. Outcome: Progressing Towards Goal  Fall Risk Interventions:   Call bell and personal items within patient reach. Bed locked and in low position and locked. Side rails up x 3.      Medication Interventions: Teach patient to arise slowly

## 2018-01-13 NOTE — ROUTINE PROCESS
Bedside and Verbal shift change report given to Jacob (oncoming nurse) by Chrissy Christianson (offgoing nurse). Report included the following information SBAR, Kardex, Procedure Summary, Intake/Output, MAR and Recent Results.

## 2018-01-13 NOTE — ROUTINE PROCESS
TRANSFER - OUT REPORT:    Verbal report given to Su Moraes RN(name) on Sergio Rodriguez  being transferred to [facility](unit) for routine progression of care       Report consisted of patients Situation, Background, Assessment and   Recommendations(SBAR). Information from the following report(s) SBAR, ED Summary, Intake/Output, Recent Results, Med Rec Status and Cardiac Rhythm sinus fahad/nsr was reviewed with the receiving nurse. Lines:   Peripheral IV 01/09/18 Right Antecubital (Active)   Site Assessment Clean, dry, & intact 1/12/2018  8:32 PM   Phlebitis Assessment 0 1/12/2018  8:32 PM   Infiltration Assessment 0 1/12/2018  8:32 PM   Dressing Status Clean, dry, & intact 1/12/2018  8:32 PM   Dressing Type Transparent 1/12/2018  8:32 PM   Hub Color/Line Status Pink;Capped;Flushed 1/12/2018  8:32 PM   Action Taken Open ports on tubing capped 1/12/2018  8:32 PM   Alcohol Cap Used Yes 1/12/2018  8:32 PM       Peripheral IV 01/10/18 Right Forearm (Active)   Site Assessment Clean, dry, & intact 1/12/2018  8:32 PM   Phlebitis Assessment 0 1/12/2018  8:32 PM   Infiltration Assessment 0 1/12/2018  8:32 PM   Dressing Status Clean, dry, & intact 1/12/2018  8:32 PM   Dressing Type Transparent 1/12/2018  8:32 PM   Hub Color/Line Status Pink; Infusing 1/12/2018  8:32 PM   Action Taken Open ports on tubing capped 1/12/2018  8:32 PM   Alcohol Cap Used Yes 1/12/2018  8:32 PM        Opportunity for questions and clarification was provided.       Patient transported with:   Registered Nurse

## 2018-01-13 NOTE — ROUTINE PROCESS
Report called 2145 but room is not clean. 2230 called back but room was isolation and there is a stat clean on it.

## 2018-01-15 ENCOUNTER — OFFICE VISIT (OUTPATIENT)
Dept: SURGERY | Age: 63
End: 2018-01-15

## 2018-01-15 ENCOUNTER — HOSPITAL ENCOUNTER (INPATIENT)
Dept: GENERAL RADIOLOGY | Age: 63
Discharge: HOME | End: 2018-01-15
Attending: INTERNAL MEDICINE

## 2018-01-15 ENCOUNTER — HOSPITAL ENCOUNTER (INPATIENT)
Dept: ULTRASOUND IMAGING | Age: 63
Discharge: HOME | End: 2018-01-15
Attending: INTERNAL MEDICINE

## 2018-01-15 VITALS
BODY MASS INDEX: 26.63 KG/M2 | SYSTOLIC BLOOD PRESSURE: 110 MMHG | WEIGHT: 196.6 LBS | OXYGEN SATURATION: 97 % | DIASTOLIC BLOOD PRESSURE: 80 MMHG | HEIGHT: 72 IN | HEART RATE: 69 BPM | RESPIRATION RATE: 20 BRPM | TEMPERATURE: 97.4 F

## 2018-01-15 DIAGNOSIS — K80.20 CALCULUS OF GALLBLADDER WITHOUT CHOLECYSTITIS WITHOUT OBSTRUCTION: Primary | ICD-10-CM

## 2018-01-15 DIAGNOSIS — Z87.19 HX OF ACUTE PANCREATITIS: ICD-10-CM

## 2018-01-15 NOTE — MR AVS SNAPSHOT
[address] [facility] [address] 
[phone] Patient: Bryan Penaloza MRN: [id number] PTX:9/41/[birth year] Visit Information Date & Time Provider Department Dept. Phone Encounter #  
 1/15/2018  3:40 PM Ej Carbajal, [address] 2 [phone] [id number] Upcoming Health Maintenance Date Due Hepatitis C Screening 1955 DTaP/Tdap/Td series (1 - Tdap) 6/13/1974 FOBT Q 1 YEAR AGE 50-75 6/13/2003 ZOSTER VACCINE AGE 60> 4/13/2013 Influenza Age 5 to Adult 8/1/2017 Allergies as of 1/15/2018  Review Complete On: 1/15/2018 By: Ej Carbajal MD  
 No Known Allergies Current Immunizations  Reviewed on 1/10/2018 No immunizations on file. Not reviewed this visit You Were Diagnosed With   
  
 Codes Comments Gallstone pancreatitis    -  Primary ICD-10-CM: K85.10 ICD-9-CM: 099.7, 574.20 Vitals BP Pulse Temp Resp Height(growth percentile) Weight(growth percentile) 110/80 69 97.4 °F (36.3 °C) (Oral) 20 6' (1.829 m) 196 lb 9.6 oz (89.2 kg) SpO2 BMI Smoking Status 97% 26.66 kg/m2 Former Smoker Vitals History BMI and BSA Data Body Mass Index Body Surface Area  
 26.66 kg/m 2 2.13 m 2 Your Updated Medication List  
  
   
This list is accurate as of: 1/15/18  5:23 PM.  Always use your most recent med list.  
  
  
  
  
 famotidine 10 mg tablet Commonly known as:  PEPCID Take  by mouth two (2) times a day. GAVISCON  mg/15 mL suspension Generic drug:  aluminum hydrox-magnesium carb Take 15 mL by mouth as needed for Indigestion. L. acidoph & paracasei- S therm- Bifido 8 billion cell Cap cap Commonly known as:  SHIRA-Q/RISAQUAD Take 1 Cap by mouth daily. levoFLOXacin 750 mg tablet Commonly known as:  Yu Pina Take 1 Tab by mouth daily. pantoprazole 20 mg tablet Commonly known as:  PROTONIX Take  by mouth daily. Introducing [facility]! Kristi Orourke introduces [employer] patient portal. Now you can access parts of your medical record, email your doctor's office, and request medication refills online. 1. In your internet browser, go to [email or web address] 2. Click on the First Time User? Click Here link in the Sign In box. You will see the New Member Sign Up page. 3. Enter your [employer] Access Code exactly as it appears below. You will not need to use this code after youve completed the sign-up process. If you do not sign up before the expiration date, you must request a new code. · [employer] Access Code: [email or web address] Expires: 2/11/2018  5:18 PM 
 
4. Enter the last four digits of your Social Security Number (xxxx) and Date of Birth (mm/dd/yyyy) as indicated and click Submit. You will be taken to the next sign-up page. 5. Create a [employer] ID. This will be your [employer] login ID and cannot be changed, so think of one that is secure and easy to remember. 6. Create a [employer] password. You can change your password at any time. 7. Enter your Password Reset Question and Answer. This can be used at a later time if you forget your password. 8. Enter your e-mail address. You will receive e-mail notification when new information is available in [address] 9. Click Sign Up. You can now view and download portions of your medical record. 10. Click the Download Summary menu link to download a portable copy of your medical information. If you have questions, please visit the Frequently Asked Questions section of the [employer] website. Remember, [employer] is NOT to be used for urgent needs. For medical emergencies, dial 911. Now available from your iPhone and Android! Please provide this summary of care documentation to your next provider. Your primary care clinician is listed as Mayda Maker.  If you have any questions after today's visit, please call [phone].

## 2018-01-15 NOTE — PROGRESS NOTES
Surgery Consultation    Subjective: Aristeo Sarmiento is a 57 y.o. male with a history of abdominal pain. He had a recent 4 day admittance to hospital for acute pancreatitis (due to alcohol use and/or gallstones). He was discharged on Sat Jan 13th, 2018 and has not experienced any associated pain since then. He just began PO Levaquin and has 9 days left. He has had no nausea and no vomiting, but reports some loss of appetite and otherwise no problems. The patient has not had jaundice, acholic stools or dark urine. He does have a history of pancreatitis, but no history of hepatitis. Findings of EUS (1/12/17)   1. Non-dilated bile duct with no stones or sludge seen. Spontaneous biliary drainage was seen. 2. Non-distended gallbladder, but with relative wall thickening and positive for small stones/sludge  3. Large duodenal diverticulum  4. Ampulla was not visualized and is likely hidden within large diverticulum  5. Heterogeneous appearing pancreatic parenchyma in setting of acute pancreatitis  6. Two small cystic structures in the body of the pancreas - most likely IPMN's vs sequelae of acute pancreatitis      Findings of MRCP of abdomen: (1/10/18)  Biliary tree: Gallbladder is mildly distended. Subtle pericholecystic fluid. No mural thickening. No biliary dilatation. No filling defect in the common bile duct, which measures 5 mm. Pancreas: Edema surrounds the pancreatic head. Subtle peripherally enhancing fluid collection inferior to the pancreatic head and superior to the third portion of the duodenum measures 4.5 x 1.9 x 4.8 cm. Based on the comparison CT, this represents a duodenal diverticulum. No pancreatic necrosis. No pancreatic mass or ductal dilatation. No divisum. IMPRESSION:   1. Acute pancreatitis. No drainable fluid collection. No pancreatic necrosis or divisum. 2. Duodenal diverticulum arises from the third portion and extends toward the pancreatic head.   3. Equivocal evidence of early or resolving cholecystitis. No biliary obstruction or choledocholithiasis. Pt is referred by Cristiana Saul MD.      There are no active problems to display for this patient. Past Medical History:   Diagnosis Date    Prostate CA [facility])       Past Surgical History:   Procedure Laterality Date    HX PROSTATECTOMY      HX ROTATOR CUFF REPAIR Right 2010      Social History   Substance Use Topics    Smoking status: Former Smoker     Quit date: 1/15/2015    Smokeless tobacco: Never Used    Alcohol use Yes      Family History   Problem Relation Age of Onset    Cancer Sister     Breast Cancer Sister     Diabetes Sister     Thyroid Cancer Sister       Current Outpatient Prescriptions   Medication Sig    levoFLOXacin (LEVAQUIN) 750 mg tablet Take 1 Tab by mouth daily.  L. acidoph & paracasei- S therm- Bifido (SHIRA-Q/RISAQUAD) 8 billion cell cap cap Take 1 Cap by mouth daily.  pantoprazole (PROTONIX) 20 mg tablet Take  by mouth daily.  famotidine (PEPCID) 10 mg tablet Take  by mouth two (2) times a day.  aluminum hydrox-magnesium carb (GAVISCON)  mg/15 mL suspension Take 15 mL by mouth as needed for Indigestion. No current facility-administered medications for this visit.        No Known Allergies     Review of Systems:  A comprehensive review of 12 systems was negative except for:   Constitutional: trouble sleeping   Ears, nose, mouth, throat, and face: sinus problems   Gastrointestinal: acid indigestion or heartburn, stomach pain   Genitourinary: prostate problems       Objective:     Visit Vitals    /80    Pulse 69    Temp 97.4 °F (36.3 °C) (Oral)    Resp 20    Ht 6' (1.829 m)    Wt 196 lb 9.6 oz (89.2 kg)    SpO2 97%    BMI 26.66 kg/m2        Physical Exam:    General:  alert, cooperative, no distress, appears stated age   Eyes:  conjunctivae and sclerae normal    Lungs:   clear to auscultation bilaterally   Heart:  Regular rate and rhythm   Abdomen:   abdomen is soft and flat without significant tenderness, masses, organomegaly or guarding, normal bowel sounds   Extremities: extremities normal, atraumatic, no edema   Neuro: Mental status: Alert, oriented, thought content appropriate  Gait: Normal             Imaging and Lab Review:     No results found for this or any previous visit (from the past 24 hour(s)). images and reports reviewed    Assessment:   Diagnoses and all orders for this visit:    1. Calculus of gallbladder without cholecystitis without obstruction    2. Hx of acute pancreatitis  Comments:  gallstone pancreatitis        Patient has not experienced any associated pain since discharge from hospital. This is a good indication that pancreatic inflammation has subsided and surgical intervention is now an appropriate approach. Recommendation:     1. Laparoscopic cholecystectomy with cholangiography. 2. I explained the indications for laparoscopic cholecystectomy as well as the alternatives. I discussed the potential risks, including but not limited to bleeding, wound infection, trocar injuries and also the possible need for conversion to open procedure. He indicates that he understands the risks, accepts and wishes to proceed. 3. A patient education booklet on laparoscopic cholecystectomy was given to the patient. 4:40 PM - 5:03 PM   Total time spent with patient, greater than 50% of the time was spent in counselin minutes.     Signed By: Alana Torres MD    January 15, 2018         Written by Melvina Chappell, as dictated by Alana Torres MD.         Cc: MD Viri Alexandre MD

## 2018-01-15 NOTE — PROGRESS NOTES
1. Have you been to the ER, urgent care clinic since your last visit? Hospitalized since your last visit? Yes When: [facility] discharged on Saturday,    2. Have you seen or consulted any other health care providers outside of the [address] [place] [address] since your last visit? Include any pap smears or colon screening.  No

## 2018-01-16 LAB
BACTERIA SPEC CULT: NORMAL
SERVICE CMNT-IMP: NORMAL

## 2018-01-22 NOTE — ANESTHESIA POSTPROCEDURE EVALUATION
Post-Anesthesia Evaluation and Assessment    Patient: Abby Chaney MRN: [id number]  SSN: [id number]    YOB: 1955  Age: 57 y.o. Sex: male       Cardiovascular Function/Vital Signs  Visit Vitals    /82 (BP 1 Location: Left arm, BP Patient Position: At rest)    Pulse (!) 51    Temp 36.8 °C (98.2 °F)    Resp 16    Ht 6' (1.829 m)    Wt 90.8 kg (200 lb 2.8 oz)    SpO2 97%    BMI 27.15 kg/m2       Patient is status post general anesthesia for Procedure(s):  ENDOSCOPIC ULTRASOUND (EUS)  ENDOSCOPIC RETROGRADE CHOLANGIOPANCREATOGRAPHY  ESOPHAGOGASTRODUODENOSCOPY (EGD). Nausea/Vomiting: None    Postoperative hydration reviewed and adequate. Pain:  Pain Scale 1: Numeric (0 - 10) (01/13/18 0908)  Pain Intensity 1: 0 (01/13/18 0908)   Managed    Neurological Status: At baseline    Mental Status and Level of Consciousness: Arousable    Pulmonary Status:   O2 Device: Room air (01/12/18 2032)   Adequate oxygenation and airway patent    Complications related to anesthesia: None    Post-anesthesia assessment completed.  No concerns    Signed By: Iman Lazo MD     January 22, 2018

## 2018-01-24 ENCOUNTER — ANESTHESIA (OUTPATIENT)
Dept: SURGERY | Age: 63
End: 2018-01-24
Payer: COMMERCIAL

## 2018-01-24 ENCOUNTER — APPOINTMENT (OUTPATIENT)
Dept: GENERAL RADIOLOGY | Age: 63
End: 2018-01-24
Attending: SURGERY
Payer: COMMERCIAL

## 2018-01-24 ENCOUNTER — ANESTHESIA EVENT (OUTPATIENT)
Dept: SURGERY | Age: 63
End: 2018-01-24
Payer: COMMERCIAL

## 2018-01-24 ENCOUNTER — HOSPITAL ENCOUNTER (OUTPATIENT)
Age: 63
Setting detail: OUTPATIENT SURGERY
Discharge: HOME | End: 2018-01-24
Attending: SURGERY | Admitting: SURGERY
Payer: COMMERCIAL

## 2018-01-24 VITALS
DIASTOLIC BLOOD PRESSURE: 75 MMHG | TEMPERATURE: 98 F | OXYGEN SATURATION: 100 % | HEIGHT: 72 IN | WEIGHT: 190 LBS | BODY MASS INDEX: 25.73 KG/M2 | RESPIRATION RATE: 14 BRPM | SYSTOLIC BLOOD PRESSURE: 136 MMHG | HEART RATE: 56 BPM

## 2018-01-24 DIAGNOSIS — K80.20 CALCULUS OF GALLBLADDER WITHOUT CHOLECYSTITIS WITHOUT OBSTRUCTION: Primary | ICD-10-CM

## 2018-01-24 DIAGNOSIS — Z90.49 S/P LAPAROSCOPIC CHOLECYSTECTOMY: ICD-10-CM

## 2018-01-24 LAB
ATRIAL RATE: 54 BPM
CALCULATED P AXIS, ECG09: 33 DEGREES
CALCULATED R AXIS, ECG10: -15 DEGREES
CALCULATED T AXIS, ECG11: 20 DEGREES
DIAGNOSIS, 93000: NORMAL
P-R INTERVAL, ECG05: 180 MS
Q-T INTERVAL, ECG07: 436 MS
QRS DURATION, ECG06: 110 MS
QTC CALCULATION (BEZET), ECG08: 413 MS
VENTRICULAR RATE, ECG03: 54 BPM

## 2018-01-24 PROCEDURE — 74011250636 HC RX REV CODE- 250/636: Performed by: ANESTHESIOLOGY

## 2018-01-24 PROCEDURE — 93005 ELECTROCARDIOGRAM TRACING: CPT

## 2018-01-24 PROCEDURE — 77030008684 HC TU ET CUF COVD -B: Performed by: ANESTHESIOLOGY

## 2018-01-24 PROCEDURE — 77030020263 HC SOL INJ SOD CL0.9% LFCR 1000ML: Performed by: SURGERY

## 2018-01-24 PROCEDURE — 77030013079 HC BLNKT BAIR HGGR 3M -A: Performed by: ANESTHESIOLOGY

## 2018-01-24 PROCEDURE — 74011000250 HC RX REV CODE- 250

## 2018-01-24 PROCEDURE — 77030012022 HC APPL CLP ENDOSC COVD -C: Performed by: SURGERY

## 2018-01-24 PROCEDURE — 77030035048 HC TRCR ENDOSC OPTCL COVD -B: Performed by: SURGERY

## 2018-01-24 PROCEDURE — 77030008756 HC TU IRR SUC STRY -B: Performed by: SURGERY

## 2018-01-24 PROCEDURE — 88304 TISSUE EXAM BY PATHOLOGIST: CPT | Performed by: SURGERY

## 2018-01-24 PROCEDURE — 74011000250 HC RX REV CODE- 250: Performed by: SURGERY

## 2018-01-24 PROCEDURE — 74300 X-RAY BILE DUCTS/PANCREAS: CPT

## 2018-01-24 PROCEDURE — 77030008771 HC TU NG SALEM SUMP -A: Performed by: ANESTHESIOLOGY

## 2018-01-24 PROCEDURE — 77030035045 HC TRCR ENDOSC VRSPRT BLDLSS COVD -B: Performed by: SURGERY

## 2018-01-24 PROCEDURE — 76210000021 HC REC RM PH II 0.5 TO 1 HR: Performed by: SURGERY

## 2018-01-24 PROCEDURE — 74011250637 HC RX REV CODE- 250/637: Performed by: ANESTHESIOLOGY

## 2018-01-24 PROCEDURE — 76010000149 HC OR TIME 1 TO 1.5 HR: Performed by: SURGERY

## 2018-01-24 PROCEDURE — 77030020782 HC GWN BAIR PAWS FLX 3M -B

## 2018-01-24 PROCEDURE — 77030031139 HC SUT VCRL2 J&J -A: Performed by: SURGERY

## 2018-01-24 PROCEDURE — 77030010507 HC ADH SKN DERMBND J&J -B: Performed by: SURGERY

## 2018-01-24 PROCEDURE — 77030026438 HC STYL ET INTUB CARD -A: Performed by: ANESTHESIOLOGY

## 2018-01-24 PROCEDURE — 77030035029 HC NDL INSUF VERES DISP COVD -B: Performed by: SURGERY

## 2018-01-24 PROCEDURE — 77030020053 HC ELECTRD LAPSCP COVD -B: Performed by: SURGERY

## 2018-01-24 PROCEDURE — 77030037032 HC INSRT SCIS CLICKLLINE DISP STOR -B: Performed by: SURGERY

## 2018-01-24 PROCEDURE — 74011636320 HC RX REV CODE- 636/320: Performed by: SURGERY

## 2018-01-24 PROCEDURE — 77030002933 HC SUT MCRYL J&J -A: Performed by: SURGERY

## 2018-01-24 PROCEDURE — 76060000033 HC ANESTHESIA 1 TO 1.5 HR: Performed by: SURGERY

## 2018-01-24 PROCEDURE — 77030032490 HC SLV COMPR SCD KNE COVD -B: Performed by: SURGERY

## 2018-01-24 PROCEDURE — 77030009852 HC PCH RTVR ENDOSC COVD -B: Performed by: SURGERY

## 2018-01-24 PROCEDURE — 77030020747 HC TU INSUF ENDOSC TELE -A: Performed by: SURGERY

## 2018-01-24 PROCEDURE — 77030004813 HC CATH CHOLGM TELE -B: Performed by: SURGERY

## 2018-01-24 PROCEDURE — 76210000016 HC OR PH I REC 1 TO 1.5 HR: Performed by: SURGERY

## 2018-01-24 PROCEDURE — 74011250636 HC RX REV CODE- 250/636

## 2018-01-24 PROCEDURE — 77030011640 HC PAD GRND REM COVD -A: Performed by: SURGERY

## 2018-01-24 RX ORDER — MIDAZOLAM HYDROCHLORIDE 1 MG/ML
INJECTION, SOLUTION INTRAMUSCULAR; INTRAVENOUS AS NEEDED
Status: DISCONTINUED | OUTPATIENT
Start: 2018-01-24 | End: 2018-01-24 | Stop reason: HOSPADM

## 2018-01-24 RX ORDER — HYDROMORPHONE HYDROCHLORIDE 1 MG/ML
0.2 INJECTION, SOLUTION INTRAMUSCULAR; INTRAVENOUS; SUBCUTANEOUS
Status: DISCONTINUED | OUTPATIENT
Start: 2018-01-24 | End: 2018-01-24 | Stop reason: HOSPADM

## 2018-01-24 RX ORDER — SODIUM CHLORIDE 0.9 % (FLUSH) 0.9 %
5-10 SYRINGE (ML) INJECTION AS NEEDED
Status: DISCONTINUED | OUTPATIENT
Start: 2018-01-24 | End: 2018-01-24 | Stop reason: HOSPADM

## 2018-01-24 RX ORDER — ROCURONIUM BROMIDE 10 MG/ML
INJECTION, SOLUTION INTRAVENOUS AS NEEDED
Status: DISCONTINUED | OUTPATIENT
Start: 2018-01-24 | End: 2018-01-24 | Stop reason: HOSPADM

## 2018-01-24 RX ORDER — DEXAMETHASONE SODIUM PHOSPHATE 4 MG/ML
INJECTION, SOLUTION INTRA-ARTICULAR; INTRALESIONAL; INTRAMUSCULAR; INTRAVENOUS; SOFT TISSUE AS NEEDED
Status: DISCONTINUED | OUTPATIENT
Start: 2018-01-24 | End: 2018-01-24 | Stop reason: HOSPADM

## 2018-01-24 RX ORDER — GLYCOPYRROLATE 0.2 MG/ML
INJECTION INTRAMUSCULAR; INTRAVENOUS AS NEEDED
Status: DISCONTINUED | OUTPATIENT
Start: 2018-01-24 | End: 2018-01-24 | Stop reason: HOSPADM

## 2018-01-24 RX ORDER — DIPHENHYDRAMINE HYDROCHLORIDE 50 MG/ML
12.5 INJECTION, SOLUTION INTRAMUSCULAR; INTRAVENOUS AS NEEDED
Status: DISCONTINUED | OUTPATIENT
Start: 2018-01-24 | End: 2018-01-24 | Stop reason: HOSPADM

## 2018-01-24 RX ORDER — PROPOFOL 10 MG/ML
INJECTION, EMULSION INTRAVENOUS AS NEEDED
Status: DISCONTINUED | OUTPATIENT
Start: 2018-01-24 | End: 2018-01-24 | Stop reason: HOSPADM

## 2018-01-24 RX ORDER — BUPIVACAINE HYDROCHLORIDE AND EPINEPHRINE 5; 5 MG/ML; UG/ML
INJECTION, SOLUTION EPIDURAL; INTRACAUDAL; PERINEURAL AS NEEDED
Status: DISCONTINUED | OUTPATIENT
Start: 2018-01-24 | End: 2018-01-24 | Stop reason: HOSPADM

## 2018-01-24 RX ORDER — ONDANSETRON 2 MG/ML
4 INJECTION INTRAMUSCULAR; INTRAVENOUS AS NEEDED
Status: DISCONTINUED | OUTPATIENT
Start: 2018-01-24 | End: 2018-01-24 | Stop reason: HOSPADM

## 2018-01-24 RX ORDER — MIDAZOLAM HYDROCHLORIDE 1 MG/ML
1 INJECTION, SOLUTION INTRAMUSCULAR; INTRAVENOUS AS NEEDED
Status: DISCONTINUED | OUTPATIENT
Start: 2018-01-24 | End: 2018-01-24 | Stop reason: HOSPADM

## 2018-01-24 RX ORDER — BUPIVACAINE HYDROCHLORIDE 5 MG/ML
30 INJECTION, SOLUTION EPIDURAL; INTRACAUDAL ONCE
Status: DISCONTINUED | OUTPATIENT
Start: 2018-01-24 | End: 2018-01-24 | Stop reason: HOSPADM

## 2018-01-24 RX ORDER — NEOSTIGMINE METHYLSULFATE 1 MG/ML
INJECTION INTRAVENOUS AS NEEDED
Status: DISCONTINUED | OUTPATIENT
Start: 2018-01-24 | End: 2018-01-24 | Stop reason: HOSPADM

## 2018-01-24 RX ORDER — SODIUM CHLORIDE 0.9 % (FLUSH) 0.9 %
5-10 SYRINGE (ML) INJECTION EVERY 8 HOURS
Status: DISCONTINUED | OUTPATIENT
Start: 2018-01-24 | End: 2018-01-24 | Stop reason: HOSPADM

## 2018-01-24 RX ORDER — MIDAZOLAM HYDROCHLORIDE 1 MG/ML
0.5 INJECTION, SOLUTION INTRAMUSCULAR; INTRAVENOUS
Status: DISCONTINUED | OUTPATIENT
Start: 2018-01-24 | End: 2018-01-24 | Stop reason: HOSPADM

## 2018-01-24 RX ORDER — MORPHINE SULFATE 10 MG/ML
2 INJECTION, SOLUTION INTRAMUSCULAR; INTRAVENOUS
Status: DISCONTINUED | OUTPATIENT
Start: 2018-01-24 | End: 2018-01-24 | Stop reason: HOSPADM

## 2018-01-24 RX ORDER — SODIUM CHLORIDE, SODIUM LACTATE, POTASSIUM CHLORIDE, CALCIUM CHLORIDE 600; 310; 30; 20 MG/100ML; MG/100ML; MG/100ML; MG/100ML
25 INJECTION, SOLUTION INTRAVENOUS CONTINUOUS
Status: DISCONTINUED | OUTPATIENT
Start: 2018-01-24 | End: 2018-01-24 | Stop reason: HOSPADM

## 2018-01-24 RX ORDER — SODIUM CHLORIDE, SODIUM LACTATE, POTASSIUM CHLORIDE, CALCIUM CHLORIDE 600; 310; 30; 20 MG/100ML; MG/100ML; MG/100ML; MG/100ML
125 INJECTION, SOLUTION INTRAVENOUS CONTINUOUS
Status: DISCONTINUED | OUTPATIENT
Start: 2018-01-24 | End: 2018-01-24 | Stop reason: HOSPADM

## 2018-01-24 RX ORDER — HYDROCODONE BITARTRATE AND ACETAMINOPHEN 5; 325 MG/1; MG/1
1 TABLET ORAL
Qty: 20 TAB | Refills: 0 | Status: ON HOLD | OUTPATIENT
Start: 2018-01-24 | End: 2021-09-15

## 2018-01-24 RX ORDER — SODIUM CHLORIDE 9 MG/ML
25 INJECTION, SOLUTION INTRAVENOUS CONTINUOUS
Status: DISCONTINUED | OUTPATIENT
Start: 2018-01-24 | End: 2018-01-24 | Stop reason: HOSPADM

## 2018-01-24 RX ORDER — FENTANYL CITRATE 50 UG/ML
INJECTION, SOLUTION INTRAMUSCULAR; INTRAVENOUS AS NEEDED
Status: DISCONTINUED | OUTPATIENT
Start: 2018-01-24 | End: 2018-01-24 | Stop reason: HOSPADM

## 2018-01-24 RX ORDER — NAPROXEN 500 MG/1
500 TABLET ORAL 2 TIMES DAILY WITH MEALS
Qty: 10 TAB | Refills: 0 | Status: SHIPPED | OUTPATIENT
Start: 2018-01-24 | End: 2018-01-29

## 2018-01-24 RX ORDER — OXYCODONE HYDROCHLORIDE 5 MG/1
5 TABLET ORAL AS NEEDED
Status: DISCONTINUED | OUTPATIENT
Start: 2018-01-24 | End: 2018-01-24 | Stop reason: HOSPADM

## 2018-01-24 RX ORDER — FENTANYL CITRATE 50 UG/ML
25 INJECTION, SOLUTION INTRAMUSCULAR; INTRAVENOUS
Status: DISCONTINUED | OUTPATIENT
Start: 2018-01-24 | End: 2018-01-24 | Stop reason: HOSPADM

## 2018-01-24 RX ORDER — ONDANSETRON 2 MG/ML
INJECTION INTRAMUSCULAR; INTRAVENOUS AS NEEDED
Status: DISCONTINUED | OUTPATIENT
Start: 2018-01-24 | End: 2018-01-24 | Stop reason: HOSPADM

## 2018-01-24 RX ORDER — LIDOCAINE HYDROCHLORIDE 20 MG/ML
INJECTION, SOLUTION EPIDURAL; INFILTRATION; INTRACAUDAL; PERINEURAL AS NEEDED
Status: DISCONTINUED | OUTPATIENT
Start: 2018-01-24 | End: 2018-01-24 | Stop reason: HOSPADM

## 2018-01-24 RX ORDER — KETOROLAC TROMETHAMINE 30 MG/ML
INJECTION, SOLUTION INTRAMUSCULAR; INTRAVENOUS AS NEEDED
Status: DISCONTINUED | OUTPATIENT
Start: 2018-01-24 | End: 2018-01-24 | Stop reason: HOSPADM

## 2018-01-24 RX ORDER — ACETAMINOPHEN 10 MG/ML
INJECTION, SOLUTION INTRAVENOUS AS NEEDED
Status: DISCONTINUED | OUTPATIENT
Start: 2018-01-24 | End: 2018-01-24 | Stop reason: HOSPADM

## 2018-01-24 RX ORDER — LIDOCAINE HYDROCHLORIDE 10 MG/ML
0.1 INJECTION, SOLUTION EPIDURAL; INFILTRATION; INTRACAUDAL; PERINEURAL AS NEEDED
Status: DISCONTINUED | OUTPATIENT
Start: 2018-01-24 | End: 2018-01-24 | Stop reason: HOSPADM

## 2018-01-24 RX ORDER — FENTANYL CITRATE 50 UG/ML
50 INJECTION, SOLUTION INTRAMUSCULAR; INTRAVENOUS AS NEEDED
Status: DISCONTINUED | OUTPATIENT
Start: 2018-01-24 | End: 2018-01-24 | Stop reason: HOSPADM

## 2018-01-24 RX ORDER — HYDROMORPHONE HYDROCHLORIDE 2 MG/ML
INJECTION, SOLUTION INTRAMUSCULAR; INTRAVENOUS; SUBCUTANEOUS AS NEEDED
Status: DISCONTINUED | OUTPATIENT
Start: 2018-01-24 | End: 2018-01-24 | Stop reason: HOSPADM

## 2018-01-24 RX ADMIN — NEOSTIGMINE METHYLSULFATE 3 MG: 1 INJECTION INTRAVENOUS at 15:19

## 2018-01-24 RX ADMIN — HYDROMORPHONE HYDROCHLORIDE 0.2 MG: 1 INJECTION, SOLUTION INTRAMUSCULAR; INTRAVENOUS; SUBCUTANEOUS at 16:00

## 2018-01-24 RX ADMIN — HYDROMORPHONE HYDROCHLORIDE 0.5 MG: 2 INJECTION, SOLUTION INTRAMUSCULAR; INTRAVENOUS; SUBCUTANEOUS at 15:32

## 2018-01-24 RX ADMIN — DEXAMETHASONE SODIUM PHOSPHATE 8 MG: 4 INJECTION, SOLUTION INTRA-ARTICULAR; INTRALESIONAL; INTRAMUSCULAR; INTRAVENOUS; SOFT TISSUE at 14:49

## 2018-01-24 RX ADMIN — MIDAZOLAM HYDROCHLORIDE 2 MG: 1 INJECTION, SOLUTION INTRAMUSCULAR; INTRAVENOUS at 14:14

## 2018-01-24 RX ADMIN — PROPOFOL 150 MG: 10 INJECTION, EMULSION INTRAVENOUS at 14:22

## 2018-01-24 RX ADMIN — FENTANYL CITRATE 100 MCG: 50 INJECTION, SOLUTION INTRAMUSCULAR; INTRAVENOUS at 14:22

## 2018-01-24 RX ADMIN — SODIUM CHLORIDE, SODIUM LACTATE, POTASSIUM CHLORIDE, AND CALCIUM CHLORIDE: 600; 310; 30; 20 INJECTION, SOLUTION INTRAVENOUS at 15:15

## 2018-01-24 RX ADMIN — ROCURONIUM BROMIDE 5 MG: 10 INJECTION, SOLUTION INTRAVENOUS at 14:22

## 2018-01-24 RX ADMIN — SODIUM CHLORIDE, SODIUM LACTATE, POTASSIUM CHLORIDE, AND CALCIUM CHLORIDE 25 ML/HR: 600; 310; 30; 20 INJECTION, SOLUTION INTRAVENOUS at 10:17

## 2018-01-24 RX ADMIN — ONDANSETRON 4 MG: 2 INJECTION INTRAMUSCULAR; INTRAVENOUS at 16:00

## 2018-01-24 RX ADMIN — OXYCODONE HYDROCHLORIDE 5 MG: 5 TABLET ORAL at 16:30

## 2018-01-24 RX ADMIN — HYDROMORPHONE HYDROCHLORIDE 0.5 MG: 2 INJECTION, SOLUTION INTRAMUSCULAR; INTRAVENOUS; SUBCUTANEOUS at 14:33

## 2018-01-24 RX ADMIN — HYDROMORPHONE HYDROCHLORIDE 0.2 MG: 1 INJECTION, SOLUTION INTRAMUSCULAR; INTRAVENOUS; SUBCUTANEOUS at 15:45

## 2018-01-24 RX ADMIN — ROCURONIUM BROMIDE 25 MG: 10 INJECTION, SOLUTION INTRAVENOUS at 14:23

## 2018-01-24 RX ADMIN — ACETAMINOPHEN 1000 MG: 10 INJECTION, SOLUTION INTRAVENOUS at 14:47

## 2018-01-24 RX ADMIN — ROCURONIUM BROMIDE 10 MG: 10 INJECTION, SOLUTION INTRAVENOUS at 14:45

## 2018-01-24 RX ADMIN — ONDANSETRON 4 MG: 2 INJECTION INTRAMUSCULAR; INTRAVENOUS at 15:14

## 2018-01-24 RX ADMIN — GLYCOPYRROLATE 0.4 MG: 0.2 INJECTION INTRAMUSCULAR; INTRAVENOUS at 15:19

## 2018-01-24 RX ADMIN — KETOROLAC TROMETHAMINE 30 MG: 30 INJECTION, SOLUTION INTRAMUSCULAR; INTRAVENOUS at 15:20

## 2018-01-24 RX ADMIN — DIPHENHYDRAMINE HYDROCHLORIDE 12.5 MG: 50 INJECTION, SOLUTION INTRAMUSCULAR; INTRAVENOUS at 15:50

## 2018-01-24 RX ADMIN — LIDOCAINE HYDROCHLORIDE 50 MG: 20 INJECTION, SOLUTION EPIDURAL; INFILTRATION; INTRACAUDAL; PERINEURAL at 14:22

## 2018-01-24 RX ADMIN — ROCURONIUM BROMIDE 10 MG: 10 INJECTION, SOLUTION INTRAVENOUS at 15:03

## 2018-01-24 RX ADMIN — HYDROMORPHONE HYDROCHLORIDE 0.5 MG: 2 INJECTION, SOLUTION INTRAMUSCULAR; INTRAVENOUS; SUBCUTANEOUS at 15:38

## 2018-01-24 NOTE — ANESTHESIA PREPROCEDURE EVALUATION
Anesthetic History   No history of anesthetic complications            Review of Systems / Medical History  Patient summary reviewed, nursing notes reviewed and pertinent labs reviewed    Pulmonary  Within defined limits                 Neuro/Psych   Within defined limits           Cardiovascular                  Exercise tolerance: >4 METS     GI/Hepatic/Renal     GERD          Comments: Common bile duct obstruction  pancreatitis Endo/Other  Within defined limits           Other Findings            Physical Exam    Airway  Mallampati: II  TM Distance: > 6 cm  Neck ROM: normal range of motion   Mouth opening: Normal     Cardiovascular    Rhythm: regular  Rate: normal         Dental  No notable dental hx       Pulmonary  Breath sounds clear to auscultation               Abdominal         Other Findings            Anesthetic Plan    ASA: 2  Anesthesia type: general          Induction: Intravenous  Anesthetic plan and risks discussed with: Patient

## 2018-01-24 NOTE — DISCHARGE INSTRUCTIONS
Patient Discharge Instructions    Fortunato Grigsby / [id number] : 1955    Admitted 2018 Discharged: 2018       PATIENT INSTRUCTIONS  GALLBLADDER SURGERY  (CHOLECYSTECTOMY)    FOLLOW-UP:  Please make an appointment with your physician in 10 - 14 day(s). Call your physician immediately if you have any fevers greater than 101.5, drainage from your wound that is not clear or looks infected, persistent bleeding, increasing abdominal pain, problems urinating, or persistent nausea/vomiting. You should be aware that you may have right shoulder pain after surgery and that this will progressively go away. This is called 'referred pain' and is from the area of the gallbladder. It can also be caused by gas that may be trapped under the diaphragm from the surgery, especially if it was performed laparoscopically through mini-incisions. This gas will progressively get reabsorbed by your body. WOUND CARE INSTRUCTIONS:   You may shower at home. If clothing rubs against the wound or causes irritation and the wound is not draining you may cover it with a dry dressing during the daytime. Try to keep the wound dry and avoid ointments on the wound unless directed to do so. If the wound becomes bright red and painful or starts to drain infected material that is not clear, please contact your physician immediately. You should also call if you begin to drain fluid that is thin and greenish-brown from the wound and appears to look like bile. If the wound though is mildly pink and has a thick firm ridge underneath it, this is normal, and is referred to as a healing ridge. This will resolve over the next 4-6 weeks. Place an ice pack on the navel incision for the next 48 hours. After that, you may use a heating pad if you feel muscle tightening or pulling. DIET:  You may eat any foods that you can tolerate. It is a good idea to eat a high fiber diet and take in plenty of fluids to prevent constipation.   If you do become constipated you may want to take a mild laxative or take ducolax tablets on a daily basis until your bowel habits are regular. Constipation can be very uncomfortable, along with straining, after recent abdominal surgery. ACTIVITY:  You are encouraged to cough and deep breath or use your incentive spirometer if you were given one, every 15-30 minutes when awake. This will help prevent respiratory complications and low grade fevers post-operatively. You may want to hug a pillow when coughing and sneezing to add additional support to the surgical area(s) which will decrease pain during these times. You are encouraged to walk and engage in light activity for the next two weeks. You should not lift more than approximately 8 pounds during this two weeks or it may put you at risk for hernia of the incisional area. Eight pounds is the weight of a full gallon of milk. · Most people are able to return to work within 1 to 2 weeks after surgery. · You may shower 24 hours after surgery. Pat the cut (incision) dry. Do not take a bath for the first week. · Your doctor will tell you when you can have sex again. MEDICATIONS:  Try to take narcotic medications and anti-inflammatory medications, such as tylenol, ibuprofen, naprosyn, etc., with food. This will minimize stomach upset from the medication. Should you develop nausea and vomiting from the pain medication, or develop a rash, please discontinue the medication and contact your physician. You should not drive, make important decisions, or operate machinery when taking narcotic pain medication. DO NOT DRIVE OR DRINK ALCOHOL FOR 24 HOURS AND/OR WHILE TAKING NARCOTIC PAIN MEDICATION. ROXICODONE 5 mg given in RECOVERY@ 4 pm    Take naproxen (Anaprox, Naprosyn, Naprelan) as scheduled (do not wait for pain) then combine with hydrocodone/acetaminophen (Lorcet, Lortab, Maxidone, Norco, Vicodin, Xodol, Zydone) as directed for severe pain.       QUESTIONS: Please feel free to call Dr. Germania Motta office ([phone]) if you have any questions, and they will be glad to assist you. Follow-up with Reece Varghese NP on 2/7/18 at 9:40 AM.    Information obtained by :    I understand that if any problems occur once I am at home I am to contact my physician. I understand and acknowledge receipt of the instructions indicated above. Physician's or R.N.'s Signature                                                                  Date/Time                                                                                                                                              Patient or Representative Signature                                                          Date/Time               After general anesthesia or intravenous sedation, for 24 hours or while taking prescription Narcotics:  · Limit your activities  · Do not drive and operate hazardous machinery  · Do not make important personal or business decisions  · Do  not drink alcoholic beverages  · If you have not urinated within 8 hours after discharge, please contact your surgeon on call.     Report the following to your surgeon:  · Excessive pain, swelling, redness or odor of or around the surgical area  · Temperature over 100.5  · Nausea and vomiting lasting longer than 4 hours or if unable to take medications  · Any signs of decreased circulation or nerve impairment to extremity: change in color, persistent  numbness, tingling, coldness or increase pain  · Any questions

## 2018-01-24 NOTE — INTERVAL H&P NOTE
H&P Update:  Que Campos was seen and examined. History and physical has been reviewed. The patient has been examined.  There have been no significant clinical changes since the completion of the originally dated History and Physical.    Signed By: Geovany Nur MD     January 24, 2018 1:46 PM

## 2018-01-24 NOTE — IP AVS SNAPSHOT
[address] [facility] Efrem Spaul[address] 13 
[phone] Patient: Juvencio Lopez MRN: [id number] ZSR:[id number] About your hospitalization You were admitted on:  January 24, 2018 You last received care in the:  [facility] PACU You were discharged on:  January 24, 2018 Why you were hospitalized Your primary diagnosis was:  Pancreatitis Your diagnoses also included:  Calculus Of Gallbladder Without Cholecystitis, S/P Laparoscopic Cholecystectomy Follow-up Information Follow up With Details Comments Contact Info Claude Scurry, NP [facility] [phone] 
[phone] Your Scheduled Appointments Wednesday February 07, 2018  9:40 AM EST  
POST OP with Claude Scurry, NP [phone] ([facility]) [address] [facility] 406 [facility] [phone] Discharge Orders None A check kayla indicates which time of day the medication should be taken. My Medications START taking these medications Instructions Each Dose to Equal  
 Morning Noon Evening Bedtime HYDROcodone-acetaminophen 5-325 mg per tablet Commonly known as:  Tra Silvius Your last dose was: Your next dose is: Take 1 Tab by mouth every six (6) hours as needed for Pain. Max Daily Amount: 4 Tabs. 1 Tab  
    
   
   
   
  
 naproxen 500 mg tablet Commonly known as:  NAPROSYN Your last dose was: Your next dose is: Take 1 Tab by mouth two (2) times daily (with meals) for 5 days. 500 mg CONTINUE taking these medications Instructions Each Dose to Equal  
 Morning Noon Evening Bedtime GAVISCON  mg/15 mL suspension Generic drug:  aluminum hydrox-magnesium carb Your last dose was: Your next dose is: Take 15 mL by mouth as needed for Indigestion. 15 mL  
    
   
   
   
  
 pantoprazole 20 mg tablet Commonly known as:  PROTONIX Your last dose was: Your next dose is: Take  by mouth daily. Where to Get Your Medications Information on where to get these meds will be given to you by the nurse or doctor. ! Ask your nurse or doctor about these medications HYDROcodone-acetaminophen 5-325 mg per tablet  
 naproxen 500 mg tablet Discharge Instructions Patient Discharge Instructions Michele Richardson / [id number] : 1955 Admitted 2018 Discharged: 2018 PATIENT INSTRUCTIONS 
GALLBLADDER SURGERY 
(CHOLECYSTECTOMY) FOLLOW-UP:  Please make an appointment with your physician in 10 - 14 day(s). Call your physician immediately if you have any fevers greater than 101.5, drainage from your wound that is not clear or looks infected, persistent bleeding, increasing abdominal pain, problems urinating, or persistent nausea/vomiting. You should be aware that you may have right shoulder pain after surgery and that this will progressively go away. This is called 'referred pain' and is from the area of the gallbladder. It can also be caused by gas that may be trapped under the diaphragm from the surgery, especially if it was performed laparoscopically through mini-incisions. This gas will progressively get reabsorbed by your body. WOUND CARE INSTRUCTIONS:   You may shower at home. If clothing rubs against the wound or causes irritation and the wound is not draining you may cover it with a dry dressing during the daytime. Try to keep the wound dry and avoid ointments on the wound unless directed to do so. If the wound becomes bright red and painful or starts to drain infected material that is not clear, please contact your physician immediately.  You should also call if you begin to drain fluid that is thin and greenish-brown from the wound and appears to look like bile. If the wound though is mildly pink and has a thick firm ridge underneath it, this is normal, and is referred to as a healing ridge. This will resolve over the next 4-6 weeks. Place an ice pack on the navel incision for the next 48 hours. After that, you may use a heating pad if you feel muscle tightening or pulling. DIET:  You may eat any foods that you can tolerate. It is a good idea to eat a high fiber diet and take in plenty of fluids to prevent constipation. If you do become constipated you may want to take a mild laxative or take ducolax tablets on a daily basis until your bowel habits are regular. Constipation can be very uncomfortable, along with straining, after recent abdominal surgery. ACTIVITY:  You are encouraged to cough and deep breath or use your incentive spirometer if you were given one, every 15-30 minutes when awake. This will help prevent respiratory complications and low grade fevers post-operatively. You may want to hug a pillow when coughing and sneezing to add additional support to the surgical area(s) which will decrease pain during these times. You are encouraged to walk and engage in light activity for the next two weeks. You should not lift more than approximately 8 pounds during this two weeks or it may put you at risk for hernia of the incisional area. Eight pounds is the weight of a full gallon of milk. · Most people are able to return to work within 1 to 2 weeks after surgery. · You may shower 24 hours after surgery. Pat the cut (incision) dry. Do not take a bath for the first week. · Your doctor will tell you when you can have sex again. MEDICATIONS:  Try to take narcotic medications and anti-inflammatory medications, such as tylenol, ibuprofen, naprosyn, etc., with food. This will minimize stomach upset from the medication.   Should you develop nausea and vomiting from the pain medication, or develop a rash, please discontinue the medication and contact your physician. You should not drive, make important decisions, or operate machinery when taking narcotic pain medication. DO NOT DRIVE OR DRINK ALCOHOL FOR 24 HOURS AND/OR WHILE TAKING NARCOTIC PAIN MEDICATION. ROXICODONE 5 mg given in RECOVERY@ 4 pm 
 
Take naproxen (Anaprox, Naprosyn, Naprelan) as scheduled (do not wait for pain) then combine with hydrocodone/acetaminophen (Lorcet, Lortab, Maxidone, Norco, Vicodin, Xodol, Zydone) as directed for severe pain. QUESTIONS:  Please feel free to call Dr. Elliot Christianson office ([phone]) if you have any questions, and they will be glad to assist you. Follow-up with Luke Brown NP on 2/7/18 at 9:40 AM. Information obtained by : 
 
I understand that if any problems occur once I am at home I am to contact my physician. I understand and acknowledge receipt of the instructions indicated above. Physician's or R.N.'s Signature                                                                  Date/Time Patient or Representative Signature                                                          Date/Time After general anesthesia or intravenous sedation, for 24 hours or while taking prescription Narcotics: · Limit your activities · Do not drive and operate hazardous machinery · Do not make important personal or business decisions · Do  not drink alcoholic beverages · If you have not urinated within 8 hours after discharge, please contact your surgeon on call. Report the following to your surgeon: · Excessive pain, swelling, redness or odor of or around the surgical area · Temperature over 100.5 · Nausea and vomiting lasting longer than 4 hours or if unable to take medications · Any signs of decreased circulation or nerve impairment to extremity: change in color, persistent  numbness, tingling, coldness or increase pain · Any questions Introducing [facility]! [facility] introduces BlogGlue patient portal. Now you can access parts of your medical record, email your doctor's office, and request medication refills online. 1. In your internet browser, go to [email or web address] 2. Click on the First Time User? Click Here link in the Sign In box. You will see the New Member Sign Up page. 3. Enter your BlogGlue Access Code exactly as it appears below. You will not need to use this code after youve completed the sign-up process. If you do not sign up before the expiration date, you must request a new code. · BlogGlue Access Code: [email or web address] Expires: 2/11/2018  5:18 PM 
 
4. Enter the last four digits of your Social Security Number (xxxx) and Date of Birth (mm/dd/yyyy) as indicated and click Submit. You will be taken to the next sign-up page. 5. Create a BlogGlue ID. This will be your BlogGlue login ID and cannot be changed, so think of one that is secure and easy to remember. 6. Create a BlogGlue password. You can change your password at any time. 7. Enter your Password Reset Question and Answer. This can be used at a later time if you forget your password. 8. Enter your e-mail address. You will receive e-mail notification when new information is available in [address] 9. Click Sign Up. You can now view and download portions of your medical record. 10. Click the Download Summary menu link to download a portable copy of your medical information.  
 
If you have questions, please visit the Frequently Asked Questions section of the View and Chew. Remember, MyChart is NOT to be used for urgent needs. For medical emergencies, dial 911. Now available from your iPhone and Android! Providers Seen During Your Hospitalization Provider Specialty Primary office phone Bee Sesay, [address] [facility] Surgery [phone] Your Primary Care Physician (PCP) Primary Care Physician Office Phone Office Fax Allison Aragon [facility] [phone] You are allergic to the following No active allergies Recent Documentation Height Weight BMI Smoking Status 1.829 m 86.2 kg 25.77 kg/m2 Former Smoker Emergency Contacts Name Discharge Info Relation Home Work Mobile [address] [facility] CAREGIVER [3] Spouse [3] [phone] Patient Belongings The following personal items are in your possession at time of discharge: 
  Dental Appliances: None  Visual Aid: Glasses      Home Medications: None   Jewelry: None  Clothing: Other (comment) (CLOTHING & SHOES TO PACU)    Other Valuables: None Discharge Instructions Attachments/References MEFS - HYDROCODONE/ACETAMINOPHEN (VICODIN, [place], LORTAB) - (BY MOUTH) (ENGLISH) MEFS - NAPROXEN (NAPROSYN, NAPROXEN SODIUM, ALEVE, ALEVE ARTHRITIS) - (BY MOUTH) (ENGLISH) Patient Handouts Hydrocodone/Acetaminophen (Vicodin, Norco, Lortab) - (By mouth) Why this medicine is used:  
Treats pain. Contact a nurse or doctor right away if you have: · Blistering, peeling, red skin rash · Fast or slow heartbeat, shallow breathing, blue lips, fingernails, or skin · Anxiety, restlessness, muscle spasms, twitching, seeing or hearing things that are not there · Dark urine or pale stools, yellow skin or eyes · Extreme weakness, sweating, seizures, cold or clammy skin · Lightheadedness, dizziness, fainting, fever, sweating Common side effects: 
· Constipation, nausea, vomiting, loss of appetite, stomach pain · Tiredness or sleepiness © 2017 [address] Information is for End User's use only and may not be sold, redistributed or otherwise used for commercial purposes. Naproxen (Naprosyn, Naproxen Sodium, Aleve, Aleve Arthritis) - (By mouth) Why this medicine is used:  
Treats fever and pain. Contact a nurse or doctor right away if you have: 
· Change in how much or how often you urinate · Severe stomach pain, vomiting blood, bloody or black tarry stools · Swelling in your hands, ankles, or feet; rapid weight gain Common side effects: · Mild nausea, diarrhea, constipation · Ringing in your ears, dizziness, headache © 2017 [address] Information is for End User's use only and may not be sold, redistributed or otherwise used for commercial purposes. Please provide this summary of care documentation to your next provider. Signatures-by signing, you are acknowledging that this After Visit Summary has been reviewed with you and you have received a copy. Patient Signature:  ____________________________________________________________ Date:  ____________________________________________________________  
  
Marina Marx Provider Signature:  ____________________________________________________________ Date:  ____________________________________________________________

## 2018-01-24 NOTE — ANESTHESIA POSTPROCEDURE EVALUATION
Post-Anesthesia Evaluation and Assessment    Patient: Janel Malik MRN: [id number]  SSN: [id number]    YOB: 1955  Age: 57 y.o. Sex: male       Cardiovascular Function/Vital Signs  Visit Vitals    /66    Pulse 67    Temp 36.6 °C (97.8 °F)    Resp 12    Ht 6' (1.829 m)    Wt 86.2 kg (190 lb)    SpO2 100%    BMI 25.77 kg/m2       Patient is status post general anesthesia for Procedure(s):  LAPAROSCOPIC CHOLECYSTECTOMY WITH CHOLANGIOGRAPHY. Nausea/Vomiting: None    Postoperative hydration reviewed and adequate. Pain:  Pain Scale 1: Numeric (0 - 10) (01/24/18 1005)  Pain Intensity 1: 0 (01/24/18 1005)   Managed    Neurological Status:   Neuro (WDL): Within Defined Limits (01/24/18 1009)   At baseline    Mental Status and Level of Consciousness: Arousable    Pulmonary Status:   O2 Device: Nasal cannula (01/24/18 1537)   Adequate oxygenation and airway patent    Complications related to anesthesia: None    Post-anesthesia assessment completed.  No concerns    Signed By: Megha Betancourt MD     January 24, 2018

## 2018-01-24 NOTE — PERIOP NOTES
Patient: Marcia Brown MRN: [id number]  SSN: [id number]   YOB: 1955  Age: 57 y.o. Sex: male     Patient is status post Procedure(s):  LAPAROSCOPIC CHOLECYSTECTOMY WITH CHOLANGIOGRAPHY. Surgeon(s) and Role:     * Kady Dykes MD - Primary    Local/Dose/Irrigation:  See [facility]                  Peripheral IV 01/24/18 Left Arm (Active)   Site Assessment Clean, dry, & intact 1/24/2018 10:16 AM   Phlebitis Assessment 0 1/24/2018 10:16 AM   Infiltration Assessment 0 1/24/2018 10:16 AM   Dressing Status Clean, dry, & intact; New 1/24/2018 10:16 AM   Dressing Type Tape;Transparent 1/24/2018 10:16 AM   Hub Color/Line Status Green 1/24/2018 10:16 AM          Orogastric Tube 01/24/18 (Active)                     Dressing/Packing:  Wound Abdomen Anterior-DRESSING TYPE: Topical skin adhesive/glue (01/24/18 1500)

## 2018-01-24 NOTE — PERIOP NOTES
D/C INSTRUCTIONS AND RX REVIEWED WITH PATIENT AND HIS WIFE AND PRINTED COPIES PROVIDED. BOTH VERBALIZE UNDERSTANDING OF ALL.

## 2018-01-24 NOTE — H&P (VIEW-ONLY)
Surgery Consultation    Subjective: Kellen Hall is a 57 y.o. male with a history of abdominal pain. He had a recent 4 day admittance to hospital for acute pancreatitis (due to alcohol use and/or gallstones). He was discharged on Sat Jan 13th, 2018 and has not experienced any associated pain since then. He just began PO Levaquin and has 9 days left. He has had no nausea and no vomiting, but reports some loss of appetite and otherwise no problems. The patient has not had jaundice, acholic stools or dark urine. He does have a history of pancreatitis, but no history of hepatitis. Findings of EUS (1/12/17)   1. Non-dilated bile duct with no stones or sludge seen. Spontaneous biliary drainage was seen. 2. Non-distended gallbladder, but with relative wall thickening and positive for small stones/sludge  3. Large duodenal diverticulum  4. Ampulla was not visualized and is likely hidden within large diverticulum  5. Heterogeneous appearing pancreatic parenchyma in setting of acute pancreatitis  6. Two small cystic structures in the body of the pancreas - most likely IPMN's vs sequelae of acute pancreatitis      Findings of MRCP of abdomen: (1/10/18)  Biliary tree: Gallbladder is mildly distended. Subtle pericholecystic fluid. No mural thickening. No biliary dilatation. No filling defect in the common bile duct, which measures 5 mm. Pancreas: Edema surrounds the pancreatic head. Subtle peripherally enhancing fluid collection inferior to the pancreatic head and superior to the third portion of the duodenum measures 4.5 x 1.9 x 4.8 cm. Based on the comparison CT, this represents a duodenal diverticulum. No pancreatic necrosis. No pancreatic mass or ductal dilatation. No divisum. IMPRESSION:   1. Acute pancreatitis. No drainable fluid collection. No pancreatic necrosis or divisum. 2. Duodenal diverticulum arises from the third portion and extends toward the pancreatic head.   3. Equivocal evidence of early or resolving cholecystitis. No biliary obstruction or choledocholithiasis. Pt is referred by Marita Carl MD.      There are no active problems to display for this patient. Past Medical History:   Diagnosis Date    Prostate CA [facility])       Past Surgical History:   Procedure Laterality Date    HX PROSTATECTOMY      HX ROTATOR CUFF REPAIR Right 2010      Social History   Substance Use Topics    Smoking status: Former Smoker     Quit date: 1/15/2015    Smokeless tobacco: Never Used    Alcohol use Yes      Family History   Problem Relation Age of Onset    Cancer Sister     Breast Cancer Sister     Diabetes Sister     Thyroid Cancer Sister       Current Outpatient Prescriptions   Medication Sig    levoFLOXacin (LEVAQUIN) 750 mg tablet Take 1 Tab by mouth daily.  L. acidoph & paracasei- S therm- Bifido (SHIRA-Q/RISAQUAD) 8 billion cell cap cap Take 1 Cap by mouth daily.  pantoprazole (PROTONIX) 20 mg tablet Take  by mouth daily.  famotidine (PEPCID) 10 mg tablet Take  by mouth two (2) times a day.  aluminum hydrox-magnesium carb (GAVISCON)  mg/15 mL suspension Take 15 mL by mouth as needed for Indigestion. No current facility-administered medications for this visit.        No Known Allergies     Review of Systems:  A comprehensive review of 12 systems was negative except for:   Constitutional: trouble sleeping   Ears, nose, mouth, throat, and face: sinus problems   Gastrointestinal: acid indigestion or heartburn, stomach pain   Genitourinary: prostate problems       Objective:     Visit Vitals    /80    Pulse 69    Temp 97.4 °F (36.3 °C) (Oral)    Resp 20    Ht 6' (1.829 m)    Wt 196 lb 9.6 oz (89.2 kg)    SpO2 97%    BMI 26.66 kg/m2        Physical Exam:    General:  alert, cooperative, no distress, appears stated age   Eyes:  conjunctivae and sclerae normal    Lungs:   clear to auscultation bilaterally   Heart:  Regular rate and rhythm   Abdomen:   abdomen is soft and flat without significant tenderness, masses, organomegaly or guarding, normal bowel sounds   Extremities: extremities normal, atraumatic, no edema   Neuro: Mental status: Alert, oriented, thought content appropriate  Gait: Normal             Imaging and Lab Review:     No results found for this or any previous visit (from the past 24 hour(s)). images and reports reviewed    Assessment:   Diagnoses and all orders for this visit:    1. Calculus of gallbladder without cholecystitis without obstruction    2. Hx of acute pancreatitis  Comments:  gallstone pancreatitis        Patient has not experienced any associated pain since discharge from hospital. This is a good indication that pancreatic inflammation has subsided and surgical intervention is now an appropriate approach. Recommendation:     1. Laparoscopic cholecystectomy with cholangiography. 2. I explained the indications for laparoscopic cholecystectomy as well as the alternatives. I discussed the potential risks, including but not limited to bleeding, wound infection, trocar injuries and also the possible need for conversion to open procedure. He indicates that he understands the risks, accepts and wishes to proceed. 3. A patient education booklet on laparoscopic cholecystectomy was given to the patient. 4:40 PM - 5:03 PM   Total time spent with patient, greater than 50% of the time was spent in counselin minutes.     Signed By: Lucia Concepcion MD    January 15, 2018         Written by Rhonda Alanis, as dictated by Lucia Concepcion MD.         Cc: MD Ynes Blunt MD

## 2018-01-24 NOTE — OP NOTES
Operative Report    Date of Surgery: 1/24/2018     Preoperative Diagnosis: CALCULOUS OF GALLBLADDER WITHOUT CHOLECYSTITIS OR OBSTRUCTION    Postoperative Diagnosis: CALCULOUS OF GALLBLADDER WITHOUT CHOLECYSTITIS OR OBSTRUCTION    Surgeon(s) and Role:     * Sunitha Norris MD - Primary      Assistant:  Mary Sargent w/  student; Amie GOODA    Anesthesia: General    Procedure: Procedure(s):  LAPAROSCOPIC CHOLECYSTECTOMY WITH CHOLANGIOGRAPHY     Findings: gallbladder with stones, Chronic inflammatory changes, cholangiogram--within normal limits    Estimated Blood Loss: <5 mL IV: LR 1300 mL           Drains: none       Specimens:   ID Type Source Tests Collected by Time Destination   1 : gallbladder Fresh Gallbladder  Sunitha Norris MD 1/24/2018 1510 Pathology                Complications:  None; patient tolerated the procedure well. Wound prophylaxis:  Not indicated. VTE prophylaxis: SCDs fitted and started prior to induction of anesthesia    Indications: Pt with history of gallstone pancreatitis; EUS showed sm stones and sludge in gallbladder, bile ducts clear. Here for elective laparoscopic cholecystectomy with cholangiography     Procedure in Detail:  The patient was seen in the Holding Area. After obtaining informed consent the patient was taken to the operating room, identified as Anisha Levi, and the procedure verified. A Time Out was held and the above information confirmed. The patient was placed supine position. After establishing general anesthesia, the abdomen was prepped and draped in standard fashion. Local anesthetic was administered to the dermis and the fascia at all the port sites. An incision was made in the LUQ then a 5-mm port was placed with the camera through the port. Placement was observed directly and no injury was seen to the underlying viscera. Insufflation was applied to achieve pneumoperitoneum of 15 mmHg.  The pneumoperitoneum was maintained and the remaining ports were placed under direct vision with the 12-mm port at the umbilicus. The patient was then positioned in reverse Trendelenburg. The gallbladder was identified; the fundus was grasped and retracted cephalad. Adhesions were lysed bluntly taking care not to injure any adjacent organs . The infundibulum was grasped and retracted laterally, exposing the peritoneum overlying the triangle of Calot. This was then divided and exposed in a blunt fashion. The cystic duct was clearly identified and bluntly dissected circumferentially. The junction of the gallbladder and cystic duct was clearly identified then ligated with an Endo Clip. An incision was made in the cystic duct and the cholangiogram catheter introduced. The catheter was secured with an Endo Clip. Cholangiography was performed under fluoroscopy. The study showed no stones and good visualization of the distal and proximal biliary tree with no filling defects or obstruction. The dye flowed in the duodenum through a tapered ampulla. The catheter was then removed. The cystic duct was then ligated with Endo Clips and divided. The cystic artery was identified, dissected free, ligated with Endoclips and divided as well. The gallbladder was dissected from the liver bed in retrograde fashion with the electrocautery hook. The gallbladder was placed in a specimen pouch. The liver bed was irrigated and inspected. Hemostasis was achieved with electrocautery. Clips were confirmed intact on the cystic duct and artery. The gallbladder was removed through the umbilical port. A 0 Vicryl suture was placed at the umbilical port site on a UR needle. Pneumoperitoneum was completely reduced then the ports were removed. The umbilical port fascia was then closed with Vicryl suture; the skin was then closed with Monocryl subcuticular and Dermabond. Instrument, sponge, and needle counts were correct at closure and at the conclusion of the case.      The patient was extubated and taken to recovery room in good condition having tolerated the procedure well. Disposition: PACU - hemodynamically stable.            Condition: stable    Signed By: Jolanta Lucero MD     January 24, 2018

## 2018-02-07 ENCOUNTER — OFFICE VISIT (OUTPATIENT)
Dept: SURGERY | Age: 63
End: 2018-02-07

## 2018-02-07 VITALS
HEART RATE: 69 BPM | SYSTOLIC BLOOD PRESSURE: 130 MMHG | WEIGHT: 199 LBS | TEMPERATURE: 97.5 F | RESPIRATION RATE: 18 BRPM | BODY MASS INDEX: 26.95 KG/M2 | OXYGEN SATURATION: 96 % | DIASTOLIC BLOOD PRESSURE: 78 MMHG | HEIGHT: 72 IN

## 2018-02-07 DIAGNOSIS — Z09 POSTOPERATIVE EXAMINATION: Primary | ICD-10-CM

## 2018-02-07 NOTE — PATIENT INSTRUCTIONS
Cholecystectomy: What to Expect at [address] [place] [address]  After your surgery, it is normal to feel weak and tired for several days after you return home. Your belly may be swollen. If you had laparoscopic surgery, you may also have pain in your shoulder for about 24 hours. You may have gas or need to burp a lot at first, and a few people get diarrhea. The diarrhea usually goes away in 2 to 4 weeks, but it may last longer. How quickly you recover depends on whether you had a laparoscopic or open surgery. · For a laparoscopic surgery, most people can go back to work or their normal routine in 1 to 2 weeks, but it may take longer, depending on the type of work you do. · For an open surgery, it will probably take 4 to 6 weeks before you get back to your normal routine. This care sheet gives you a general idea about how long it will take for you to recover. However, each person recovers at a different pace. Follow the steps below to get better as quickly as possible. How can you care for yourself at home? Activity  ? · Rest when you feel tired. Getting enough sleep will help you recover. ? · Try to walk each day. Start out by walking a little more than you did the day before. Gradually increase the amount you walk. Walking boosts blood flow and helps prevent pneumonia and constipation. ? · For about 2 to 4 weeks, avoid lifting anything that would make you strain. This may include a child, heavy grocery bags and milk containers, a heavy briefcase or backpack, cat litter or dog food bags, or a vacuum . ? · Avoid strenuous activities, such as biking, jogging, weightlifting, and aerobic exercise, until your doctor says it is okay. ? · You may shower 24 to 48 hours after surgery, if your doctor okays it. Pat the cut (incision) dry. Do not take a bath for the first 2 weeks, or until your doctor tells you it is okay.    ? · You may drive when you are no longer taking pain medicine and can quickly move your foot from the gas pedal to the brake. You must also be able to sit comfortably for a long period of time, even if you do not plan to go far. You might get caught in traffic. ? · For a laparoscopic surgery, most people can go back to work or their normal routine in 1 to 2 weeks, but it may take longer. For an open surgery, it will probably take 4 to 6 weeks before you get back to your normal routine. ? · Your doctor will tell you when you can have sex again. ? Diet  ? · Eat smaller meals more often instead of fewer larger meals. You can eat a normal diet, but avoid eating fatty foods for about 1 month. Fatty foods include hamburger, whole milk, cheese, and many snack foods. If your stomach is upset, try bland, low-fat foods like plain rice, broiled chicken, toast, and yogurt. ? · Drink plenty of fluids (unless your doctor tells you not to). ? · If you have diarrhea, try avoiding spicy foods, dairy products, fatty foods, and alcohol. You can also watch to see if specific foods cause it, and stop eating them. If the diarrhea continues for more than 2 weeks, talk to your doctor. ? · You may notice that your bowel movements are not regular right after your surgery. This is common. Try to avoid constipation and straining with bowel movements. You may want to take a fiber supplement every day. If you have not had a bowel movement after a couple of days, ask your doctor about taking a mild laxative. Medicines  ? · Your doctor will tell you if and when you can restart your medicines. He or she will also give you instructions about taking any new medicines. ? · If you take blood thinners, such as warfarin (Coumadin), clopidogrel (Plavix), or aspirin, be sure to talk to your doctor. He or she will tell you if and when to start taking those medicines again. Make sure that you understand exactly what your doctor wants you to do. ? · Take pain medicines exactly as directed.   ¨ If the doctor gave you a prescription medicine for pain, take it as prescribed. ¨ If you are not taking a prescription pain medicine, take an over-the-counter medicine such as acetaminophen (Tylenol), ibuprofen (Advil, Motrin), or naproxen (Aleve). Read and follow all instructions on the label. ¨ Do not take two or more pain medicines at the same time unless the doctor told you to. Many pain medicines contain acetaminophen, which is Tylenol. Too much Tylenol can be harmful. ? · If you think your pain medicine is making you sick to your stomach:  ¨ Take your medicine after meals (unless your doctor tells you not to). ¨ Ask your doctor for a different pain medicine. ? · If your doctor prescribed antibiotics, take them as directed. Do not stop taking them just because you feel better. You need to take the full course of antibiotics. Incision care  ? · If you have strips of tape on the incision, or cut, leave the tape on for a week or until it falls off.   ? · After 24 to 48 hours, wash the area daily with warm, soapy water, and pat it dry. ? · You may have staples to hold the cut together. Keep them dry until your doctor takes them out. This is usually in 7 to 10 days. ? · Keep the area clean and dry. You may cover it with a gauze bandage if it weeps or rubs against clothing. Change the bandage every day. ?Ice  ? · To reduce swelling and pain, put ice or a cold pack on your belly for 10 to 20 minutes at a time. Do this every 1 to 2 hours. Put a thin cloth between the ice and your skin. Follow-up care is a key part of your treatment and safety. Be sure to make and go to all appointments, and call your doctor if you are having problems. It's also a good idea to know your test results and keep a list of the medicines you take. When should you call for help? Call 911 anytime you think you may need emergency care. For example, call if:  ? · You passed out (lost consciousness). ? · You are short of breath. [place] We[place] ? Call your doctor now or seek immediate medical care if:  ? · You are sick to your stomach and cannot drink fluids. ? · You have pain that does not get better when you take your pain medicine. ? · You cannot pass stools or gas. ? · You have signs of infection, such as:  ¨ Increased pain, swelling, warmth, or redness. ¨ Red streaks leading from the incision. ¨ Pus draining from the incision. ¨ A fever. ? · Bright red blood has soaked through the bandage over your incision. ? · You have loose stitches, or your incision comes open. ? · You have signs of a blood clot in your leg (called a deep vein thrombosis), such as:  ¨ Pain in your calf, back of knee, thigh, or groin. ¨ Redness and swelling in your leg or groin. ? Watch closely for any changes in your health, and be sure to contact your doctor if you have any problems. Where can you learn more? Go to http://courtney-alvin.info/. Enter 689 74 [phone] in the search box to learn more about \"Cholecystectomy: What to Expect at Home. \"  Current as of: May 12, 2017  Content Version: 11.4  © 1721-9041 [employer]. Care instructions adapted under license by [employer] (which disclaims liability or warranty for this information). If you have questions about a medical condition or this instruction, always ask your healthcare professional. [address] any warranty or liability for your use of this information.

## 2018-02-07 NOTE — PROGRESS NOTES
1. Have you been to the ER, urgent care clinic since your last visit? Hospitalized since your last visit? No    2. Have you seen or consulted any other health care providers outside of the [address] [place] [address] since your last visit? Include any pap smears or colon screening.  No

## 2018-02-07 NOTE — MR AVS SNAPSHOT
[address] [facility] [address] [facility] [address] [id number] [phone] Patient: Matilde Youssef MRN: [id number] LIY:[id number] Visit Information Date & Time Provider Department Dept. Phone Encounter #  
 2/7/2018  9:40 AM Sharan Brandon NP [address] [facility] [address] [facility] [phone] [id number] Upcoming Health Maintenance Date Due Hepatitis C Screening 1955 DTaP/Tdap/Td series (1 - Tdap) 6/13/1974 FOBT Q 1 YEAR AGE 50-75 6/13/2003 ZOSTER VACCINE AGE 60> 4/13/2013 Influenza Age 5 to Adult 8/1/2017 Allergies as of 2/7/2018  Review Complete On: 2/7/2018 By: Sharan Brandon NP No Known Allergies Current Immunizations  Reviewed on 1/10/2018 No immunizations on file. Not reviewed this visit You Were Diagnosed With   
  
 Codes Comments Postoperative examination    -  Primary ICD-10-CM: L13 ICD-9-CM: V67.00 Vitals BP Pulse Temp Resp Height(growth percentile) Weight(growth percentile) 130/78 (BP 1 Location: Left arm, BP Patient Position: Sitting) 69 97.5 °F (36.4 °C) (Oral) 18 6' (1.829 m) 199 lb (90.3 kg) SpO2 BMI Smoking Status 96% 26.99 kg/m2 Former Smoker Vitals History BMI and BSA Data Body Mass Index Body Surface Area  
 26.99 kg/m 2 2.14 m 2 Your Updated Medication List  
  
   
This list is accurate as of: 2/7/18 10:09 AM.  Always use your most recent med list.  
  
  
  
  
 GAVISCON  mg/15 mL suspension Generic drug:  aluminum hydrox-magnesium carb Take 15 mL by mouth as needed for Indigestion. HYDROcodone-acetaminophen 5-325 mg per tablet Commonly known as:  Maverick Norlander Take 1 Tab by mouth every six (6) hours as needed for Pain. Max Daily Amount: 4 Tabs. pantoprazole 20 mg tablet Commonly known as:  PROTONIX Take  by mouth daily. Patient Instructions Cholecystectomy: What to Expect at [facility] Your Recovery After your surgery, it is normal to feel weak and tired for several days after you return home. Your belly may be swollen. If you had laparoscopic surgery, you may also have pain in your shoulder for about 24 hours. You may have gas or need to burp a lot at first, and a few people get diarrhea. The diarrhea usually goes away in 2 to 4 weeks, but it may last longer. How quickly you recover depends on whether you had a laparoscopic or open surgery. · For a laparoscopic surgery, most people can go back to work or their normal routine in 1 to 2 weeks, but it may take longer, depending on the type of work you do. · For an open surgery, it will probably take 4 to 6 weeks before you get back to your normal routine. This care sheet gives you a general idea about how long it will take for you to recover. However, each person recovers at a different pace. Follow the steps below to get better as quickly as possible. How can you care for yourself at home? Activity ? · Rest when you feel tired. Getting enough sleep will help you recover. ? · Try to walk each day. Start out by walking a little more than you did the day before. Gradually increase the amount you walk. Walking boosts blood flow and helps prevent pneumonia and constipation. ? · For about 2 to 4 weeks, avoid lifting anything that would make you strain. This may include a child, heavy grocery bags and milk containers, a heavy briefcase or backpack, cat litter or dog food bags, or a vacuum . ? · Avoid strenuous activities, such as biking, jogging, weightlifting, and aerobic exercise, until your doctor says it is okay. ? · You may shower 24 to 48 hours after surgery, if your doctor okays it. Pat the cut (incision) dry. Do not take a bath for the first 2 weeks, or until your doctor tells you it is okay.   
? · You may drive when you are no longer taking pain medicine and can quickly move your foot from the gas pedal to the brake. You must also be able to sit comfortably for a long period of time, even if you do not plan to go far. You might get caught in traffic. ? · For a laparoscopic surgery, most people can go back to work or their normal routine in 1 to 2 weeks, but it may take longer. For an open surgery, it will probably take 4 to 6 weeks before you get back to your normal routine. ? · Your doctor will tell you when you can have sex again. ? Diet ? · Eat smaller meals more often instead of fewer larger meals. You can eat a normal diet, but avoid eating fatty foods for about 1 month. Fatty foods include hamburger, whole milk, cheese, and many snack foods. If your stomach is upset, try bland, low-fat foods like plain rice, broiled chicken, toast, and yogurt. ? · Drink plenty of fluids (unless your doctor tells you not to). ? · If you have diarrhea, try avoiding spicy foods, dairy products, fatty foods, and alcohol. You can also watch to see if specific foods cause it, and stop eating them. If the diarrhea continues for more than 2 weeks, talk to your doctor. ? · You may notice that your bowel movements are not regular right after your surgery. This is common. Try to avoid constipation and straining with bowel movements. You may want to take a fiber supplement every day. If you have not had a bowel movement after a couple of days, ask your doctor about taking a mild laxative. Medicines ? · Your doctor will tell you if and when you can restart your medicines. He or she will also give you instructions about taking any new medicines. ? · If you take blood thinners, such as warfarin (Coumadin), clopidogrel (Plavix), or aspirin, be sure to talk to your doctor. He or she will tell you if and when to start taking those medicines again. Make sure that you understand exactly what your doctor wants you to do. ? · Take pain medicines exactly as directed. ¨ If the doctor gave you a prescription medicine for pain, take it as prescribed. ¨ If you are not taking a prescription pain medicine, take an over-the-counter medicine such as acetaminophen (Tylenol), ibuprofen (Advil, Motrin), or naproxen (Aleve). Read and follow all instructions on the label. ¨ Do not take two or more pain medicines at the same time unless the doctor told you to. Many pain medicines contain acetaminophen, which is Tylenol. Too much Tylenol can be harmful. ? · If you think your pain medicine is making you sick to your stomach: 
¨ Take your medicine after meals (unless your doctor tells you not to). ¨ Ask your doctor for a different pain medicine. ? · If your doctor prescribed antibiotics, take them as directed. Do not stop taking them just because you feel better. You need to take the full course of antibiotics. Incision care ? · If you have strips of tape on the incision, or cut, leave the tape on for a week or until it falls off.  
? · After 24 to 48 hours, wash the area daily with warm, soapy water, and pat it dry. ? · You may have staples to hold the cut together. Keep them dry until your doctor takes them out. This is usually in 7 to 10 days. ? · Keep the area clean and dry. You may cover it with a gauze bandage if it weeps or rubs against clothing. Change the bandage every day. ?Ice ? · To reduce swelling and pain, put ice or a cold pack on your belly for 10 to 20 minutes at a time. Do this every 1 to 2 hours. Put a thin cloth between the ice and your skin. Follow-up care is a key part of your treatment and safety. Be sure to make and go to all appointments, and call your doctor if you are having problems. It's also a good idea to know your test results and keep a list of the medicines you take. When should you call for help? Call 911 anytime you think you may need emergency care. For example, call if: 
? · You passed out (lost consciousness). ? · You are short of breath. [place] ? Call your doctor now or seek immediate medical care if: 
? · You are sick to your stomach and cannot drink fluids. ? · You have pain that does not get better when you take your pain medicine. ? · You cannot pass stools or gas. ? · You have signs of infection, such as: 
¨ Increased pain, swelling, warmth, or redness. ¨ Red streaks leading from the incision. ¨ Pus draining from the incision. ¨ A fever. ? · Bright red blood has soaked through the bandage over your incision. ? · You have loose stitches, or your incision comes open. ? · You have signs of a blood clot in your leg (called a deep vein thrombosis), such as: 
¨ Pain in your calf, back of knee, thigh, or groin. ¨ Redness and swelling in your leg or groin. ? Watch closely for any changes in your health, and be sure to contact your doctor if you have any problems. Where can you learn more? Go to http://courtney-alvin.info/. Enter 577 40 [phone] in the search box to learn more about \"Cholecystectomy: What to Expect at Home. \" Current as of: May 12, 2017 Content Version: 11.4 © 0802-7521 [employer]. Care instructions adapted under license by [employer] (which disclaims liability or warranty for this information). If you have questions about a medical condition or this instruction, always ask your healthcare professional. [place] [address] any warranty or liability for your use of this information. Introducing [facility]! [facility] introduces TicketStumbler patient portal. Now you can access parts of your medical record, email your doctor's office, and request medication refills online. 1. In your internet browser, go to [email or web address]"[email or web address]. [email or web address]/"[employer] 2. Click on the First Time User? Click Here link in the Sign In box. You will see the New Member Sign Up page. 3. Enter your [employer] Access Code exactly as it appears below.  You will not need to use this code after youve completed the sign-up process. If you do not sign up before the expiration date, you must request a new code. · [employer] Access Code: [email or web address] Expires: 2/11/2018  5:18 PM 
 
4. Enter the last four digits of your Social Security Number (xxxx) and Date of Birth (mm/dd/yyyy) as indicated and click Submit. You will be taken to the next sign-up page. 5. Create a [employer] ID. This will be your [employer] login ID and cannot be changed, so think of one that is secure and easy to remember. 6. Create a [employer] password. You can change your password at any time. 7. Enter your Password Reset Question and Answer. This can be used at a later time if you forget your password. 8. Enter your e-mail address. You will receive e-mail notification when new information is available in [address]. 9. Click Sign Up. You can now view and download portions of your medical record. 10. Click the Download Summary menu link to download a portable copy of your medical information. If you have questions, please visit the Frequently Asked Questions section of the [employer] website. Remember, [employer] is NOT to be used for urgent needs. For medical emergencies, dial 911. Now available from your iPhone and Android! Please provide this summary of care documentation to your next provider. Your primary care clinician is listed as [place]. If you have any questions after today's visit, please call [phone].

## 2021-09-10 ENCOUNTER — TRANSCRIBE ORDER (OUTPATIENT)
Dept: REGISTRATION | Age: 66
End: 2021-09-10

## 2021-09-10 ENCOUNTER — HOSPITAL ENCOUNTER (OUTPATIENT)
Dept: PREADMISSION TESTING | Age: 66
Discharge: HOME | End: 2021-09-10
Payer: MEDICARE

## 2021-09-10 DIAGNOSIS — Z01.812 PRE-PROCEDURE LAB EXAM: Primary | ICD-10-CM

## 2021-09-10 DIAGNOSIS — Z01.812 PRE-PROCEDURE LAB EXAM: ICD-10-CM

## 2021-09-10 PROCEDURE — U0005 INFEC AGEN DETEC AMPLI PROBE: HCPCS

## 2021-09-11 LAB
SARS-COV-2, XPLCVT: NOT DETECTED
SOURCE, COVRS: NORMAL

## 2021-09-15 ENCOUNTER — ANESTHESIA EVENT (OUTPATIENT)
Dept: ENDOSCOPY | Age: 66
End: 2021-09-15
Payer: MEDICARE

## 2021-09-15 ENCOUNTER — HOSPITAL ENCOUNTER (OUTPATIENT)
Age: 66
Setting detail: OUTPATIENT SURGERY
Discharge: HOME | End: 2021-09-15
Attending: INTERNAL MEDICINE | Admitting: INTERNAL MEDICINE
Payer: MEDICARE

## 2021-09-15 ENCOUNTER — ANESTHESIA (OUTPATIENT)
Dept: ENDOSCOPY | Age: 66
End: 2021-09-15
Payer: MEDICARE

## 2021-09-15 VITALS
SYSTOLIC BLOOD PRESSURE: 115 MMHG | OXYGEN SATURATION: 96 % | HEIGHT: 72 IN | DIASTOLIC BLOOD PRESSURE: 80 MMHG | RESPIRATION RATE: 9 BRPM | HEART RATE: 55 BPM | WEIGHT: 187 LBS | TEMPERATURE: 97.5 F | BODY MASS INDEX: 25.33 KG/M2

## 2021-09-15 PROCEDURE — 74011250636 HC RX REV CODE- 250/636: Performed by: INTERNAL MEDICINE

## 2021-09-15 PROCEDURE — 2709999900 HC NON-CHARGEABLE SUPPLY: Performed by: INTERNAL MEDICINE

## 2021-09-15 PROCEDURE — 88342 IMHCHEM/IMCYTCHM 1ST ANTB: CPT

## 2021-09-15 PROCEDURE — 76040000007: Performed by: INTERNAL MEDICINE

## 2021-09-15 PROCEDURE — 74011250636 HC RX REV CODE- 250/636: Performed by: NURSE ANESTHETIST, CERTIFIED REGISTERED

## 2021-09-15 PROCEDURE — 76060000032 HC ANESTHESIA 0.5 TO 1 HR: Performed by: INTERNAL MEDICINE

## 2021-09-15 PROCEDURE — 88305 TISSUE EXAM BY PATHOLOGIST: CPT

## 2021-09-15 PROCEDURE — 77030021593 HC FCPS BIOP ENDOSC BSC -A: Performed by: INTERNAL MEDICINE

## 2021-09-15 PROCEDURE — 74011000250 HC RX REV CODE- 250: Performed by: NURSE ANESTHETIST, CERTIFIED REGISTERED

## 2021-09-15 RX ORDER — MIDAZOLAM HYDROCHLORIDE 1 MG/ML
.25-5 INJECTION, SOLUTION INTRAMUSCULAR; INTRAVENOUS
Status: DISCONTINUED | OUTPATIENT
Start: 2021-09-15 | End: 2021-09-15 | Stop reason: HOSPADM

## 2021-09-15 RX ORDER — NALOXONE HYDROCHLORIDE 0.4 MG/ML
0.4 INJECTION, SOLUTION INTRAMUSCULAR; INTRAVENOUS; SUBCUTANEOUS
Status: DISCONTINUED | OUTPATIENT
Start: 2021-09-15 | End: 2021-09-15 | Stop reason: HOSPADM

## 2021-09-15 RX ORDER — SODIUM CHLORIDE 0.9 % (FLUSH) 0.9 %
5-40 SYRINGE (ML) INJECTION EVERY 8 HOURS
Status: DISCONTINUED | OUTPATIENT
Start: 2021-09-15 | End: 2021-09-15 | Stop reason: HOSPADM

## 2021-09-15 RX ORDER — SODIUM CHLORIDE 9 MG/ML
INJECTION, SOLUTION INTRAVENOUS
Status: DISCONTINUED | OUTPATIENT
Start: 2021-09-15 | End: 2021-09-15 | Stop reason: HOSPADM

## 2021-09-15 RX ORDER — LIDOCAINE HYDROCHLORIDE 20 MG/ML
INJECTION, SOLUTION EPIDURAL; INFILTRATION; INTRACAUDAL; PERINEURAL AS NEEDED
Status: DISCONTINUED | OUTPATIENT
Start: 2021-09-15 | End: 2021-09-15 | Stop reason: HOSPADM

## 2021-09-15 RX ORDER — ATROPINE SULFATE 0.1 MG/ML
0.5 INJECTION INTRAVENOUS
Status: DISCONTINUED | OUTPATIENT
Start: 2021-09-15 | End: 2021-09-15 | Stop reason: HOSPADM

## 2021-09-15 RX ORDER — DEXTROMETHORPHAN/PSEUDOEPHED 2.5-7.5/.8
1.2 DROPS ORAL
Status: DISCONTINUED | OUTPATIENT
Start: 2021-09-15 | End: 2021-09-15 | Stop reason: HOSPADM

## 2021-09-15 RX ORDER — EPINEPHRINE 0.1 MG/ML
1 INJECTION INTRACARDIAC; INTRAVENOUS
Status: DISCONTINUED | OUTPATIENT
Start: 2021-09-15 | End: 2021-09-15 | Stop reason: HOSPADM

## 2021-09-15 RX ORDER — FLUMAZENIL 0.1 MG/ML
0.2 INJECTION INTRAVENOUS
Status: DISCONTINUED | OUTPATIENT
Start: 2021-09-15 | End: 2021-09-15 | Stop reason: HOSPADM

## 2021-09-15 RX ORDER — PROPOFOL 10 MG/ML
INJECTION, EMULSION INTRAVENOUS AS NEEDED
Status: DISCONTINUED | OUTPATIENT
Start: 2021-09-15 | End: 2021-09-15 | Stop reason: HOSPADM

## 2021-09-15 RX ORDER — FENTANYL CITRATE 50 UG/ML
25-200 INJECTION, SOLUTION INTRAMUSCULAR; INTRAVENOUS
Status: DISCONTINUED | OUTPATIENT
Start: 2021-09-15 | End: 2021-09-15 | Stop reason: HOSPADM

## 2021-09-15 RX ORDER — SODIUM CHLORIDE 0.9 % (FLUSH) 0.9 %
5-40 SYRINGE (ML) INJECTION AS NEEDED
Status: DISCONTINUED | OUTPATIENT
Start: 2021-09-15 | End: 2021-09-15 | Stop reason: HOSPADM

## 2021-09-15 RX ORDER — SODIUM CHLORIDE 9 MG/ML
50 INJECTION, SOLUTION INTRAVENOUS CONTINUOUS
Status: DISCONTINUED | OUTPATIENT
Start: 2021-09-15 | End: 2021-09-15 | Stop reason: HOSPADM

## 2021-09-15 RX ADMIN — PROPOFOL 50 MG: 10 INJECTION, EMULSION INTRAVENOUS at 14:18

## 2021-09-15 RX ADMIN — PROPOFOL 50 MG: 10 INJECTION, EMULSION INTRAVENOUS at 14:11

## 2021-09-15 RX ADMIN — PROPOFOL 50 MG: 10 INJECTION, EMULSION INTRAVENOUS at 14:15

## 2021-09-15 RX ADMIN — SODIUM CHLORIDE: 900 INJECTION, SOLUTION INTRAVENOUS at 13:55

## 2021-09-15 RX ADMIN — PROPOFOL 50 MG: 10 INJECTION, EMULSION INTRAVENOUS at 14:26

## 2021-09-15 RX ADMIN — PROPOFOL 100 MG: 10 INJECTION, EMULSION INTRAVENOUS at 14:02

## 2021-09-15 RX ADMIN — SODIUM CHLORIDE: 900 INJECTION, SOLUTION INTRAVENOUS at 14:30

## 2021-09-15 RX ADMIN — PROPOFOL 50 MG: 10 INJECTION, EMULSION INTRAVENOUS at 14:21

## 2021-09-15 RX ADMIN — LIDOCAINE HYDROCHLORIDE 80 MG: 20 INJECTION, SOLUTION EPIDURAL; INFILTRATION; INTRACAUDAL; PERINEURAL at 14:02

## 2021-09-15 RX ADMIN — PROPOFOL 50 MG: 10 INJECTION, EMULSION INTRAVENOUS at 14:06

## 2021-09-15 RX ADMIN — PROPOFOL 50 MG: 10 INJECTION, EMULSION INTRAVENOUS at 14:04
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
## 2021-09-15 NOTE — ANESTHESIA POSTPROCEDURE EVALUATION
Post-Anesthesia Evaluation and Assessment    Patient: Orquidea Naqvi MRN: [id number]  SSN: [id number]    YOB: 1955  Age: 74 y.o. Sex: male      I have evaluated the patient and they are stable and ready for discharge from the PACU. Cardiovascular Function/Vital Signs  Visit Vitals  /79   Pulse 65   Temp 36.4 °C (97.5 °F)   Resp 12   Ht 6' (1.829 m)   Wt 84.8 kg (187 lb)   SpO2 96%   BMI 25.36 kg/m²       Patient is status post MAC anesthesia for Procedure(s):  ESOPHAGOGASTRODUODENOSCOPY (EGD) AND COLONOSCOPY   :-  .  ESOPHAGOGASTRODUODENAL (EGD) BIOPSY  COLON BIOPSY. Nausea/Vomiting: None    Postoperative hydration reviewed and adequate. Pain:  Pain Scale 1: Visual (09/15/21 1437)  Pain Intensity 1: 0 (09/15/21 1437)   Managed    Neurological Status: At baseline    Mental Status, Level of Consciousness: Alert and  oriented to person, place, and time    Pulmonary Status:   O2 Device: None (Room air) (09/15/21 1437)   Adequate oxygenation and airway patent    Complications related to anesthesia: None    Post-anesthesia assessment completed. No concerns    Signed By: Elvira Sol MD     September 15, 2021              Procedure(s):  ESOPHAGOGASTRODUODENOSCOPY (EGD) AND COLONOSCOPY   :-  .  ESOPHAGOGASTRODUODENAL (EGD) BIOPSY  COLON BIOPSY. MAC    <BSHSIANPOST>    INITIAL Post-op Vital signs:   Vitals Value Taken Time   /79 09/15/21 1450   Temp 36.4 °C (97.5 °F) 09/15/21 1437   Pulse 66 09/15/21 1453   Resp 10 09/15/21 1453   SpO2 95 % 09/15/21 1453   Vitals shown include unvalidated device data.

## 2021-09-15 NOTE — PERIOP NOTES

## 2021-09-15 NOTE — DISCHARGE INSTRUCTIONS
[address]  [address] [facility], [address] [place] [address]    EGD/COLON DISCHARGE INSTRUCTIONS    Lul Dolores  [id number]  1955    Discomfort:  Sore throat- throat lozenges or warm salt water gargle  redness at IV site- apply warm compress to area; if redness or soreness persist- contact your physician  Gaseous discomfort- walking, belching will help relieve any discomfort  You may not operate a vehicle for 12 hours  You may not engage in an occupation involving machinery or appliances for rest of today  You may not drink alcoholic beverages for at least 12 hours  Avoid making any critical decisions for at least 24 hour  DIET  You may resume your regular diet - however -  remember your colon is empty and a heavy meal will produce gas. Avoid these foods:  vegetables, fried / greasy foods, carbonated drinks    ACTIVITY  You may resume your normal daily activities   Spend the remainder of the day resting -  avoid any strenuous activity. CALL M.D. ANY SIGN OF   Increasing pain, nausea, vomiting  Abdominal distension (swelling)  New increased bleeding (oral or rectal)  Fever (chills)  Pain in chest area  Bloody discharge from nose or mouth  Shortness of breath    Follow-up Instructions:   Call Dr. Jessenia Quintanilla for any questions or problems. Telephone # [phone]    ENDOSCOPY FINDINGS:   Your endoscopy showed mild gastritis. Biopsies were taken. Your colonoscopy showed no polyps. Biopsies were taken rule out microscopic inflammation. We will contact you about the results and the next step in the plan. Signed By: Alla Beasley. Douglas Miller MD     9/15/2021  2:37 PM         Learning About Coronavirus (COVID-19)  Coronavirus (COVID-19): Overview  What is coronavirus ([id number]-26)? The coronavirus disease (COVID-19) is caused by a virus. It is an illness that was first found in [place], [place], in December 2019. It has since spread worldwide. The virus can cause fever, cough, and trouble breathing.  In severe cases, it can cause pneumonia and make it hard to breathe without help. It can cause death. Coronaviruses are a large group of viruses. They cause the common cold. They also cause more serious illnesses like Middle East respiratory syndrome (MERS) and severe acute respiratory syndrome (SARS). COVID-19 is caused by a novel coronavirus. That means it's a new type that has not been seen in people before. This virus spreads person-to-person through droplets from coughing and sneezing. It can also spread when you are close to someone who is infected. And it can spread when you touch something that has the virus on it, such as a doorknob or a tabletop. What can you do to protect yourself from coronavirus (COVID-19)? The best way to protect yourself from getting sick is to:  · Avoid areas where there is an outbreak. · Avoid contact with people who may be infected. · Wash your hands often with soap or alcohol-based hand sanitizers. · Avoid crowds and try to stay at least 6 feet away from other people. · Wash your hands often, especially after you cough or sneeze. Use soap and water, and scrub for at least 20 seconds. If soap and water aren't available, use an alcohol-based hand . · Avoid touching your mouth, nose, and eyes. What can you do to avoid spreading the virus to others? To help avoid spreading the virus to others:  · Cover your mouth with a tissue when you cough or sneeze. Then throw the tissue in the trash. · Use a disinfectant to clean things that you touch often. · Stay home if you are sick or have been exposed to the virus. Don't go to school, work, or public areas. And don't use public transportation. · If you are sick:  ? Leave your home only if you need to get medical care. But call the doctor's office first so they know you're coming. And wear a face mask, if you have one.  ? If you have a face mask, wear it whenever you're around other people.  It can help stop the spread of the virus when you cough or sneeze. ? Clean and disinfect your home every day. Use household  and disinfectant wipes or sprays. Take special care to clean things that you grab with your hands. These include doorknobs, remote controls, phones, and handles on your refrigerator and microwave. And don't forget countertops, tabletops, bathrooms, and computer keyboards. When to call for help  Call 911 anytime you think you may need emergency care. For example, call if:  · You have severe trouble breathing. (You can't talk at all.)  · You have constant chest pain or pressure. · You are severely dizzy or lightheaded. · You are confused or can't think clearly. · Your face and lips have a blue color. · You pass out (lose consciousness) or are very hard to wake up. Call your doctor now if you develop symptoms such as:  · Shortness of breath. · Fever. · Cough. If you need to get care, call ahead to the doctor's office for instructions before you go. Make sure you wear a face mask, if you have one, to prevent exposing other people to the virus. Where can you get the latest information? The following health organizations are tracking and studying this virus. Their websites contain the most up-to-date information. Bj Carrillo also learn what to do if you think you may have been exposed to the virus. · U.S. Centers for Disease Control and Prevention (CDC): The CDC provides updated news about the disease and travel advice. The website also tells you how to prevent the spread of infection. www.cdc.gov  · World Health Organization [facility]): WHO offers information about the virus outbreaks. WHO also has travel advice. www.who.int  Current as of: April 1, 2020               Content Version: 12.4  © 2754-7883 [employer].    Care instructions adapted under license by your healthcare professional. If you have questions about a medical condition or this instruction, always ask your healthcare professional. [employer] disclaims any warranty or liability for your use of this information.

## 2021-09-15 NOTE — PROCEDURES
[address] [facility]  174 [facility], [address] (EGD) Procedure Note    Alva Hernandez  1955  [id number]      Procedure: Endoscopic Gastroduodenoscopy --diagnostic, with biopsy    Indication: GERD, bloating    Pre-operative Diagnosis: see indication above    Post-operative Diagnosis: see findings below    : Charmayne Gerold. Bry Wright MD    Staff: Endoscopy Technician-1: Gertrude Marquis  Endoscopy RN-1: Ava Bennett RN     Referring Provider:  Casimiro Vidal MD      Anesthesia/Sedation:  MAC anesthesia Propofol        Procedure Details     After informed consent was obtained for the procedure, with all risks and benefits of procedure explained the patient was taken to the endoscopy suite and placed in the left lateral decubitus position. Following sequential administration of sedation as per above, the endoscope was inserted into the mouth and advanced under direct vision to second portion of the duodenum. A careful inspection was made as the gastroscope was withdrawn, including a retroflexed view of the proximal stomach; findings and interventions are described below. Findings:   Esophagus: irregular Z line - cold biopsied  Stomach: patchy antral erythema - cold biopsied  Duodenum: normal to second portion - cold biopsied      Therapies: as above    Specimens: 1. Duodenum, 2. Gastric, 3. GE junction         EBL: None      Complications:   None; patient tolerated the procedure well. Impression:    As above    Recommendations:  1. Follow up surgical pathology  2. Continue PPI  3. Avoid non-essential NSAID's, alcohol, tobacco products  4. Proceed to colonoscopy    Signed By: Charmayne Gerold.  Bry Wright MD     9/15/2021  2:40 PM

## 2021-09-15 NOTE — ANESTHESIA PREPROCEDURE EVALUATION
Anesthetic History   No history of anesthetic complications            Review of Systems / Medical History  Patient summary reviewed, nursing notes reviewed and pertinent labs reviewed    Pulmonary  Within defined limits                 Neuro/Psych   Within defined limits           Cardiovascular                  Exercise tolerance: >4 METS     GI/Hepatic/Renal     GERD          Comments: Common bile duct obstruction  pancreatitis Endo/Other  Within defined limits           Other Findings              Physical Exam    Airway  Mallampati: II  TM Distance: > 6 cm  Neck ROM: normal range of motion   Mouth opening: Normal     Cardiovascular    Rhythm: regular  Rate: normal         Dental  No notable dental hx       Pulmonary  Breath sounds clear to auscultation               Abdominal         Other Findings            Anesthetic Plan    ASA: 2  Anesthesia type: MAC          Induction: Intravenous  Anesthetic plan and risks discussed with: Patient

## 2021-09-15 NOTE — ROUTINE PROCESS
Jorge Hernandez  1955  [id number]    Situation:  Verbal report received from: Lio Evans  Procedure: Procedure(s):  ESOPHAGOGASTRODUODENOSCOPY (EGD) AND COLONOSCOPY   :-  .  ESOPHAGOGASTRODUODENAL (EGD) BIOPSY  COLON BIOPSY    Background:    Preoperative diagnosis: COLON CANCER SCREENING   ACID REFLUX  Postoperative diagnosis: 1)Gastritis  2)Hiatal Hernia  2)Diverticulosis    :  Dr. James Schuster  Assistant(s): Endoscopy Technician-1: Victoria Dumont  Endoscopy RN-1: Dorcas Parsons RN    Specimens:   ID Type Source Tests Collected by Time Destination   1 : Duodenum Bx Preservative Duodenum  Tan Garcia MD 9/15/2021 1408 Pathology   2 : Gastric Bx Preservative Gastric  Tan Garcia MD 9/15/2021 1409 Pathology   3 : GE junction bx Preservative GE Junction  Tan Garcia MD 9/15/2021 1410 Pathology   4 : Random Colon Bx Preservative Colon  Tan Garcia MD 9/15/2021 1427 Pathology     H. Pylori  no    Assessment:  Intra-procedure medications   Anesthesia gave intra-procedure sedation and medications, see anesthesia flow sheet yes    Intravenous fluids: NS@ KVO     Vital signs stable     Abdominal assessment: round and soft     Recommendation:  Discharge patient per MD order.   Family or Friend Spouse  Permission to share finding with family or friend yes

## 2022-03-19 PROBLEM — Z90.49 S/P LAPAROSCOPIC CHOLECYSTECTOMY: Status: ACTIVE | Noted: 2018-01-24

## 2022-03-20 PROBLEM — K80.20 CALCULUS OF GALLBLADDER WITHOUT CHOLECYSTITIS: Status: ACTIVE | Noted: 2018-01-16

## 2022-07-29 ENCOUNTER — TRANSCRIBE ORDER (OUTPATIENT)
Dept: SCHEDULING | Age: 67
End: 2022-07-29

## 2022-07-29 DIAGNOSIS — S46.012D TRAUMATIC COMPLETE TEAR OF LEFT ROTATOR CUFF, SUBSEQUENT ENCOUNTER: Primary | ICD-10-CM

## 2022-08-02 ENCOUNTER — TRANSCRIBE ORDER (OUTPATIENT)
Dept: SCHEDULING | Age: 67
End: 2022-08-02

## 2022-08-02 DIAGNOSIS — S46.012D TRAUMATIC TEAR OF LEFT ROTATOR CUFF, SUBSEQUENT ENCOUNTER: Primary | ICD-10-CM

## 2022-08-15 ENCOUNTER — HOSPITAL ENCOUNTER (OUTPATIENT)
Dept: MRI IMAGING | Age: 67
Discharge: HOME | End: 2022-08-15
Attending: ORTHOPAEDIC SURGERY
Payer: MEDICARE

## 2022-08-15 DIAGNOSIS — S46.012D TRAUMATIC TEAR OF LEFT ROTATOR CUFF, SUBSEQUENT ENCOUNTER: ICD-10-CM

## 2022-08-15 PROCEDURE — 73221 MRI JOINT UPR EXTREM W/O DYE: CPT

## 2023-04-21 DIAGNOSIS — S46.012D TRAUMATIC COMPLETE TEAR OF LEFT ROTATOR CUFF, SUBSEQUENT ENCOUNTER: Primary | ICD-10-CM

## 2023-05-01 ENCOUNTER — TRANSCRIBE ORDER (OUTPATIENT)
Dept: SCHEDULING | Age: 68
End: 2023-05-01

## 2023-05-01 DIAGNOSIS — E78.00 ELEVATED CHOLESTEROL: Primary | ICD-10-CM

## 2023-05-15 ENCOUNTER — TRANSCRIBE ORDERS (OUTPATIENT)
Facility: HOSPITAL | Age: 68
End: 2023-05-15

## 2023-05-15 DIAGNOSIS — E78.00 ELEVATED CHOLESTEROL: Primary | ICD-10-CM

## 2023-05-24 ENCOUNTER — HOSPITAL ENCOUNTER (OUTPATIENT)
Facility: HOSPITAL | Age: 68
Discharge: HOME | End: 2023-05-27

## 2023-05-24 DIAGNOSIS — E78.00 ELEVATED CHOLESTEROL: ICD-10-CM

## 2023-05-24 PROCEDURE — 75571 CT HRT W/O DYE W/CA TEST: CPT

## 2023-12-24 ENCOUNTER — APPOINTMENT (OUTPATIENT)
Facility: HOSPITAL | Age: 68
End: 2023-12-24
Payer: MEDICARE

## 2023-12-24 ENCOUNTER — HOSPITAL ENCOUNTER (EMERGENCY)
Facility: HOSPITAL | Age: 68
Discharge: HOME | End: 2023-12-24
Attending: EMERGENCY MEDICINE
Payer: MEDICARE

## 2023-12-24 VITALS
HEART RATE: 81 BPM | RESPIRATION RATE: 16 BRPM | OXYGEN SATURATION: 97 % | TEMPERATURE: 99.2 F | SYSTOLIC BLOOD PRESSURE: 145 MMHG | DIASTOLIC BLOOD PRESSURE: 77 MMHG

## 2023-12-24 DIAGNOSIS — M54.42 ACUTE BILATERAL LOW BACK PAIN WITH BILATERAL SCIATICA: Primary | ICD-10-CM

## 2023-12-24 DIAGNOSIS — M25.561 ACUTE PAIN OF RIGHT KNEE: ICD-10-CM

## 2023-12-24 DIAGNOSIS — M54.41 ACUTE BILATERAL LOW BACK PAIN WITH BILATERAL SCIATICA: Primary | ICD-10-CM

## 2023-12-24 DIAGNOSIS — R70.0 ELEVATED ERYTHROCYTE SEDIMENTATION RATE: ICD-10-CM

## 2023-12-24 DIAGNOSIS — R79.82 ELEVATED C-REACTIVE PROTEIN (CRP): ICD-10-CM

## 2023-12-24 LAB
ALBUMIN SERPL-MCNC: 3.3 G/DL (ref 3.5–5)
ALBUMIN/GLOB SERPL: 0.8 (ref 1.1–2.2)
ALP SERPL-CCNC: 100 U/L (ref 45–117)
ALT SERPL-CCNC: 40 U/L (ref 12–78)
ANION GAP SERPL CALC-SCNC: 3 MMOL/L (ref 5–15)
AST SERPL-CCNC: 31 U/L (ref 15–37)
BASOPHILS # BLD: 0 K/UL (ref 0–0.1)
BASOPHILS NFR BLD: 0 % (ref 0–1)
BILIRUB SERPL-MCNC: 0.5 MG/DL (ref 0.2–1)
BUN SERPL-MCNC: 16 MG/DL (ref 6–20)
BUN/CREAT SERPL: 14 (ref 12–20)
CALCIUM SERPL-MCNC: 9 MG/DL (ref 8.5–10.1)
CHLORIDE SERPL-SCNC: 101 MMOL/L (ref 97–108)
CO2 SERPL-SCNC: 31 MMOL/L (ref 21–32)
COMMENT:: NORMAL
CREAT SERPL-MCNC: 1.15 MG/DL (ref 0.7–1.3)
CRP SERPL-MCNC: 10.7 MG/DL (ref 0–0.6)
DIFFERENTIAL METHOD BLD: ABNORMAL
EOSINOPHIL # BLD: 0 K/UL (ref 0–0.4)
EOSINOPHIL NFR BLD: 0 % (ref 0–7)
ERYTHROCYTE [DISTWIDTH] IN BLOOD BY AUTOMATED COUNT: 11.9 % (ref 11.5–14.5)
ERYTHROCYTE [SEDIMENTATION RATE] IN BLOOD: 67 MM/HR (ref 0–20)
FLUAV AG NPH QL IA: NEGATIVE
FLUBV AG NOSE QL IA: NEGATIVE
GLOBULIN SER CALC-MCNC: 4.3 G/DL (ref 2–4)
GLUCOSE SERPL-MCNC: 144 MG/DL (ref 65–100)
HCT VFR BLD AUTO: 39.3 % (ref 36.6–50.3)
HGB BLD-MCNC: 12.9 G/DL (ref 12.1–17)
IMM GRANULOCYTES # BLD AUTO: 0.1 K/UL (ref 0–0.04)
IMM GRANULOCYTES NFR BLD AUTO: 1 % (ref 0–0.5)
LYMPHOCYTES # BLD: 0.9 K/UL (ref 0.8–3.5)
LYMPHOCYTES NFR BLD: 8 % (ref 12–49)
MCH RBC QN AUTO: 30.2 PG (ref 26–34)
MCHC RBC AUTO-ENTMCNC: 32.8 G/DL (ref 30–36.5)
MCV RBC AUTO: 92 FL (ref 80–99)
MONOCYTES # BLD: 0.9 K/UL (ref 0–1)
MONOCYTES NFR BLD: 8 % (ref 5–13)
NEUTS SEG # BLD: 9.4 K/UL (ref 1.8–8)
NEUTS SEG NFR BLD: 83 % (ref 32–75)
NRBC # BLD: 0 K/UL (ref 0–0.01)
NRBC BLD-RTO: 0 PER 100 WBC
PLATELET # BLD AUTO: 417 K/UL (ref 150–400)
PMV BLD AUTO: 9.2 FL (ref 8.9–12.9)
POTASSIUM SERPL-SCNC: 4 MMOL/L (ref 3.5–5.1)
PROT SERPL-MCNC: 7.6 G/DL (ref 6.4–8.2)
RBC # BLD AUTO: 4.27 M/UL (ref 4.1–5.7)
SARS-COV-2 RDRP RESP QL NAA+PROBE: NOT DETECTED
SODIUM SERPL-SCNC: 135 MMOL/L (ref 136–145)
SOURCE: NORMAL
SPECIMEN HOLD: NORMAL
WBC # BLD AUTO: 11.3 K/UL (ref 4.1–11.1)

## 2023-12-24 PROCEDURE — 6370000000 HC RX 637 (ALT 250 FOR IP): Performed by: STUDENT IN AN ORGANIZED HEALTH CARE EDUCATION/TRAINING PROGRAM

## 2023-12-24 PROCEDURE — 36415 COLL VENOUS BLD VENIPUNCTURE: CPT

## 2023-12-24 PROCEDURE — 73521 X-RAY EXAM HIPS BI 2 VIEWS: CPT

## 2023-12-24 PROCEDURE — 80053 COMPREHEN METABOLIC PANEL: CPT

## 2023-12-24 PROCEDURE — 6360000002 HC RX W HCPCS: Performed by: STUDENT IN AN ORGANIZED HEALTH CARE EDUCATION/TRAINING PROGRAM

## 2023-12-24 PROCEDURE — 85025 COMPLETE CBC W/AUTO DIFF WBC: CPT

## 2023-12-24 PROCEDURE — 85652 RBC SED RATE AUTOMATED: CPT

## 2023-12-24 PROCEDURE — 99284 EMERGENCY DEPT VISIT MOD MDM: CPT

## 2023-12-24 PROCEDURE — 87635 SARS-COV-2 COVID-19 AMP PRB: CPT

## 2023-12-24 PROCEDURE — 87804 INFLUENZA ASSAY W/OPTIC: CPT

## 2023-12-24 PROCEDURE — 96374 THER/PROPH/DIAG INJ IV PUSH: CPT

## 2023-12-24 PROCEDURE — 86140 C-REACTIVE PROTEIN: CPT

## 2023-12-24 RX ORDER — PREDNISONE 50 MG/1
50 TABLET ORAL DAILY
Qty: 5 TABLET | Refills: 0 | Status: SHIPPED | OUTPATIENT
Start: 2023-12-25 | End: 2023-12-30

## 2023-12-24 RX ORDER — METHOCARBAMOL 750 MG/1
750 TABLET, FILM COATED ORAL 4 TIMES DAILY
Qty: 28 TABLET | Refills: 0 | Status: SHIPPED | OUTPATIENT
Start: 2023-12-24 | End: 2023-12-31

## 2023-12-24 RX ORDER — METHOCARBAMOL 750 MG/1
750 TABLET, FILM COATED ORAL 4 TIMES DAILY
Qty: 28 TABLET | Refills: 0 | Status: SHIPPED | OUTPATIENT
Start: 2023-12-24 | End: 2023-12-24

## 2023-12-24 RX ORDER — OXYCODONE HYDROCHLORIDE AND ACETAMINOPHEN 5; 325 MG/1; MG/1
1 TABLET ORAL
Status: COMPLETED | OUTPATIENT
Start: 2023-12-24 | End: 2023-12-24

## 2023-12-24 RX ORDER — OXYCODONE HYDROCHLORIDE 5 MG/1
5 TABLET ORAL EVERY 6 HOURS PRN
Qty: 12 TABLET | Refills: 0 | Status: SHIPPED | OUTPATIENT
Start: 2023-12-24 | End: 2023-12-27

## 2023-12-24 RX ORDER — METHOCARBAMOL 500 MG/1
750 TABLET, FILM COATED ORAL
Status: COMPLETED | OUTPATIENT
Start: 2023-12-24 | End: 2023-12-24

## 2023-12-24 RX ORDER — PREDNISONE 20 MG/1
60 TABLET ORAL DAILY
Status: DISCONTINUED | OUTPATIENT
Start: 2023-12-24 | End: 2023-12-24 | Stop reason: HOSPADM

## 2023-12-24 RX ORDER — PREDNISONE 50 MG/1
50 TABLET ORAL DAILY
Qty: 5 TABLET | Refills: 0 | Status: SHIPPED | OUTPATIENT
Start: 2023-12-25 | End: 2023-12-24

## 2023-12-24 RX ORDER — KETOROLAC TROMETHAMINE 30 MG/ML
15 INJECTION, SOLUTION INTRAMUSCULAR; INTRAVENOUS
Status: COMPLETED | OUTPATIENT
Start: 2023-12-24 | End: 2023-12-24

## 2023-12-24 RX ADMIN — KETOROLAC TROMETHAMINE 15 MG: 30 INJECTION, SOLUTION INTRAMUSCULAR at 14:27

## 2023-12-24 RX ADMIN — OXYCODONE AND ACETAMINOPHEN 1 TABLET: 5; 325 TABLET ORAL at 13:30

## 2023-12-24 RX ADMIN — METHOCARBAMOL TABLETS 750 MG: 500 TABLET, COATED ORAL at 11:55

## 2023-12-24 RX ADMIN — PREDNISONE 60 MG: 20 TABLET ORAL at 11:55

## 2023-12-24 NOTE — ED PROVIDER NOTES
Maura Almanza MD  [address] [facility] [address]  [phone]    Schedule an appointment as soon as possible for a visit       Corie Obrien MD  [address]  [address]  [phone]    Schedule an appointment as soon as possible for a visit       Almas Schreiber [facility]  [phone]    Schedule an appointment as soon as possible for a visit       [facility] EMERGENCY [address] [place]  [phone]  Go to   If symptoms worsen      DISCHARGE MEDICATIONS:  Discharge Medication List as of 12/24/2023  2:53 PM        START taking these medications    Details   oxyCODONE (ROXICODONE) 5 MG immediate release tablet Take 1 tablet by mouth every 6 hours as needed for Pain for up to 3 days. Intended supply: 3 days.  Take lowest dose possible to manage pain Max Daily Amount: 20 mg, Disp-12 tablet, R-0Normal               (Please note that portions of this note were completed with a voice recognition program.  Efforts were made to edit the dictations but occasionally words are mis-transcribed.)    TAWANA Ogden (electronically signed)  Physician Assistant        Bev Ogden  12/26/23 2131

## 2023-12-24 NOTE — DISCHARGE INSTRUCTIONS
Take prednisone as prescribed. Take robaxin to help with muscle spasm. Alternate aceteminophen 1000mg and ibuprofen 800mg every 4 hours as needed for pain. Take oxycodone when pain is severe. Please schedule an appointment to be seen by orthopedic spine specialist, rheumatologist and your primary care physician. If you develop new or worsening symptoms return to ER.

## 2023-12-26 ENCOUNTER — HOSPITAL ENCOUNTER (OUTPATIENT)
Facility: HOSPITAL | Age: 68
Discharge: HOME | End: 2023-12-29

## 2023-12-26 ENCOUNTER — HOSPITAL ENCOUNTER (OUTPATIENT)
Facility: HOSPITAL | Age: 68
Discharge: HOME | End: 2023-12-29
Payer: MEDICARE

## 2023-12-26 DIAGNOSIS — M23.203 DEGENERATIVE TEAR OF MEDIAL MENISCUS OF RIGHT KNEE: ICD-10-CM

## 2023-12-26 PROCEDURE — 73721 MRI JNT OF LWR EXTRE W/O DYE: CPT

## 2025-02-15 NOTE — ED TRIAGE NOTES
[facility] MEDICINE PROGRESS NOTE    Patient: Pee Piper Today's Date: 2/15/2025   YOB: 1953 Admission Date: 2/14/2025  8:34 PM   MRN: [id number] Inpatient LOS: 0 day(s)   Room: 2202/A Hospital Day:  Hospital Day: 2      Subjective and History     Subjective and overnight events:    Patient seen and examined by me in follow up.  Awake in bed, oriented x3, NAD  Denies w/d symptoms  No nvd/abd pain  Slight HA and head sore from fall  Not yet up out of bed    CIWA scores of 0 - 1, denies h/o alcohol withdrawal    Reviewed pertinent history: Medical History, Surgical History, Social History, Family History    ROS:   Review of Systems   Constitutional:  Negative for activity change, appetite change, chills and fever.   HENT: Negative.     Respiratory: Negative.     Cardiovascular: Negative.    Gastrointestinal: Negative.    Genitourinary: Negative.    Musculoskeletal: Negative.    Skin: Negative.    Neurological:  Negative for dizziness, weakness and light-headedness.   Psychiatric/Behavioral:  Negative for confusion.      Pertinent systems negative except as above    Medications: Reviewed       Physical Examination       Vital 24 Hour Range Most Recent Value   Temperature Temp  Min: 97.4 °F (36.3 °C)  Max: 98 °F (36.7 °C) 97.5 °F (36.4 °C)   Pulse Pulse  Min: 77  Max: 93 88   Respiratory Resp  Min: 16  Max: 20 20   Blood Pressure BP  Min: 112/76  Max: 147/84 (!) 147/84 (RN Notified)   Pulse Oximetry SpO2  Min: 98 %  Max: 100 % 99 %   Arterial BP No data recorded     O2 No data recorded       Intake and Output:    Intake/Output Summary (Last 24 hours) at 2/15/2025 0709  Last data filed at 2/15/2025 0600  Gross per 24 hour   Intake --   Output 200 ml   Net -200 ml       Last Stool Occurrence:       Vital Most Recent Value First Value   Weight 74 kg (163 lb 2.3 oz) Weight: 82 kg (180 lb 12.4 oz)   Height 5' 6\" (167.6 cm) Height: 5' 5\" (165.1 cm)   BMI 26.33 N/A     Physical  Pt c/o bilateral hip pain, lower back, knee pain and sometimes shoulder pain, seen at ortho on call, denies fever, had an MRI 5 days ago Exam  Vitals reviewed.   Constitutional:       General: He is not in acute distress.     Appearance: Normal appearance. He is not ill-appearing or diaphoretic.   HENT:      Head: Normocephalic.      Comments: Abrasion top of head     Nose: No congestion.      Mouth/Throat:      Mouth: Mucous membranes are moist.      Neck: Neck supple.   Eyes:      General: No scleral icterus.        Right eye: No discharge.         Left eye: No discharge.   Cardiovascular:      Rate and Rhythm: Normal rate and regular rhythm.      Heart sounds: No murmur heard.  Pulmonary:      Effort: Pulmonary effort is normal. No respiratory distress.      Breath sounds: No stridor. No wheezing or rhonchi.   Abdominal:      General: There is no distension.      Palpations: Abdomen is soft. There is no mass.      Tenderness: There is no abdominal tenderness.      Hernia: No hernia is present.   Musculoskeletal:         General: No swelling.   Skin:     General: Skin is warm.   Neurological:      Mental Status: He is alert and oriented to person, place, and time.      Cranial Nerves: No cranial nerve deficit.   Psychiatric:         Thought Content: Thought content normal.         Test Results     Labs:  Lab work has been reviewed and pertinent findings discussed in the Assessment and Plan.      Radiology: Imaging studies have been reviewed and pertinent findings discussed in the Assessment and Plan.      Tubes, Devices, Monitoring     Telemetry: On      Ross: No    Assessment and Plan     #Fall and syncope  - CT head no acute process.  He does show diffuse atrophy with microangiopathic white matter changes and encephalomalacia in the posterior right frontal lobe  - check orthostatic vital signs,  - check Echo  - given significantly elevated lactic may also need to consider seizure on differential, check EEG  - PT/OT     #Anion gap metabolic acidosis   #alcoholic ketoacidosis  #Lactic acidosis  - does not meet sepsis criteria.  No focus of  infection.  Hold off on further antibiotics  - empiric thiamine given on admission, continue oral thiamine and folic acid  - Trend lactic acid: continue to trend down after fluids  - will cap fluids and monitor     #SANDIP, resolved  - cap fluids     #A-fib on Eliquis  - continue home Eliquis for AC and Toprol for rate control     #Alcohol use and history of alcohol withdrawal  - CIWA protocol with Ativan, thiamine and folic acid supplementation  - scores remain low at 0-1, denies h/o w/d    #Hypomagnesemia  - supplement and monitor        Body mass index is 26.33 kg/m².: Overweight BMI 25-29    DVT Prophylaxis: Eliquis    Smoking status: non smoker      Diet:  Regular; Yes, Medical Nutrition Management by Rd (Registered Dietitian) Diet    Consults:    IP CONSULT TO SOCIAL WORK    Therapy orders:  PT/OT    Limited English proficiency with :  No      Advanced Directives     Code Status: Selective Treatment/DNR      Discharge Plan     The patients treatment plans were discussed with patient and RN.     Recommendations for Discharge   SW     PT     OT     SLP       Anticipated discharge destination: TBD      Barriers to Discharge: weakness, CIWA protocol, further w/u of syncope      Jamil Turner MD   [facility] Hospitalist  Please contact via secure chat  From 7pm to 7am,  please contact the Hospitalist on call: [phone]

## (undated) DEVICE — CATH IV AUTOGRD BC BLU 22GA 25 -- INSYTE

## (undated) DEVICE — CUFF BLD PRSS AD SM SZ 10 FOR 20-26CM LIMB VLY SFT W/O TUBE

## (undated) DEVICE — TRNQT TEXT 1X18IN BLU LF DISP -- CONVERT TO ITEM 362165

## (undated) DEVICE — SOLIDIFIER FLUID 3000 CC ABSORB

## (undated) DEVICE — SPECIMEN RETRIEVAL POUCH: Brand: ENDO CATCH GOLD

## (undated) DEVICE — DEVON™ KNEE AND BODY STRAP 60" X 3" (1.5 M X 7.6 CM): Brand: DEVON

## (undated) DEVICE — SYR 3ML LL TIP 1/10ML GRAD --

## (undated) DEVICE — SYRINGE MED 20ML STD CLR PLAS LUERLOCK TIP N CTRL DISP

## (undated) DEVICE — NEONATAL-ADULT SPO2 SENSOR: Brand: NELLCOR

## (undated) DEVICE — DEVICE LCK BILI RAP EXCHG OLPS --

## (undated) DEVICE — ENDO CARRY-ON PROCEDURE KIT INCLUDES ENZYMATIC SPONGE, GAUZE, BIOHAZARD LABEL, TRAY, LUBRICANT, DIRTY SCOPE LABEL, WATER LABEL, TRAY, DRAWSTRING PAD, AND DEFENDO 4-PIECE KIT.: Brand: ENDO CARRY-ON PROCEDURE KIT

## (undated) DEVICE — Z DISCONTINUED NO SUB IDED SET EXTN W/ 4 W STPCOCK M SPIN LOK 36IN

## (undated) DEVICE — DERMABOND SKIN ADH 0.7ML -- DERMABOND ADVANCED 12/BX

## (undated) DEVICE — BAG BELONG PT PERS CLEAR HANDL

## (undated) DEVICE — ELECTRODE ES 36CM LAP FLAT L HK COAT DISP CLEANCOAT

## (undated) DEVICE — NEEDLE HYPO 18GA L1.5IN PNK S STL HUB POLYPR SHLD REG BVL

## (undated) DEVICE — Device: Brand: SINGLE USE SOFT BRUSH

## (undated) DEVICE — Z DISCONTINUED NO SUB IDED CANNULA NSL 65FT W O2 SAMP LN PED SHT TERM END TDL FILTERLN

## (undated) DEVICE — BLADELESS OPTICAL TROCAR WITH FIXATION CANNULA: Brand: VERSAPORT

## (undated) DEVICE — DRAPE,REIN 53X77,STERILE: Brand: MEDLINE

## (undated) DEVICE — INSUFFLATION NEEDLE: Brand: SURGINEEDLE

## (undated) DEVICE — TUBING HYDR IRR --

## (undated) DEVICE — BITEBLOCK ENDOSCP 60FR MAXI WHT POLYETH STURDY W/ VELC WVN

## (undated) DEVICE — FORCEPS BX L240CM JAW DIA2.8MM L CAP W/ NDL MIC MESH TOOTH

## (undated) DEVICE — INFECTION CONTROL KIT SYS

## (undated) DEVICE — KENDALL RADIOLUCENT FOAM MONITORING ELECTRODE -RECTANGULAR SHAPE: Brand: KENDALL

## (undated) DEVICE — 1200 GUARD II KIT W/5MM TUBE W/O VAC TUBE: Brand: GUARDIAN

## (undated) DEVICE — Device

## (undated) DEVICE — SYR LR LCK 1ML GRAD NSAF 30ML --

## (undated) DEVICE — SET ADMIN 16ML TBNG L100IN 2 Y INJ SITE IV PIGGY BK DISP

## (undated) DEVICE — REM POLYHESIVE ADULT PATIENT RETURN ELECTRODE: Brand: VALLEYLAB

## (undated) DEVICE — NEEDLE HYPO 22GA L1.5IN BLK S STL HUB POLYPR SHLD REG BVL

## (undated) DEVICE — CLIP APPLIER WITH CLIP LOGIC TECHNOLOGY: Brand: ENDO CLIP III

## (undated) DEVICE — SET CHOLANGIOGRAPHY 4FR L60CM W/ ARW KARLAN BLLN CATH CRV

## (undated) DEVICE — NDL FLTR TIP 5 MIC 18GX1.5IN --

## (undated) DEVICE — 3000CC GUARDIAN II: Brand: GUARDIAN

## (undated) DEVICE — BLADELESS OPTICAL TROCAR WITH FIXATION CANNULA: Brand: VERSAONE

## (undated) DEVICE — (D)PREP SKN CHLRAPRP APPL 26ML -- CONVERT TO ITEM 371833

## (undated) DEVICE — PREP SKN CHLRAPRP SNGL 1.75ML --

## (undated) DEVICE — MEDI-VAC NON-CONDUCTIVE SUCTION TUBING: Brand: CARDINAL HEALTH

## (undated) DEVICE — NDL PRT INJ NSAF BLNT 18GX1.5 --

## (undated) DEVICE — TUBING INSUFLTN 10FT LUER -- CONVERT TO ITEM 368568

## (undated) DEVICE — SYR 5ML 1/5 GRAD LL NSAF LF --

## (undated) DEVICE — KIT COLON W/ 1.1OZ LUB AND 2 END

## (undated) DEVICE — STERILE POLYISOPRENE POWDER-FREE SURGICAL GLOVES: Brand: PROTEXIS

## (undated) DEVICE — KIT COMPLIANCE W ENDOGLDE + 11 NO BRSH ENDOKT

## (undated) DEVICE — CLICKLINE SCISSORS INSERT: Brand: CLICKLINE

## (undated) DEVICE — KIT IV STRT W CHLORAPREP PD 1ML

## (undated) DEVICE — SUTURE SZ 0 27IN 5/8 CIR UR-6  TAPER PT VIOLET ABSRB VICRYL J603H

## (undated) DEVICE — SUTURE MCRYL SZ 4-0 L27IN ABSRB UD L19MM PS-2 1/2 CIR PRIM Y426H

## (undated) DEVICE — BLOCK BITE ENDO --

## (undated) DEVICE — BW-400L DISP SNGL-END CLEANINGBRUSH: Brand: OLYMPUS

## (undated) DEVICE — CANN NASAL O2 CAPNOGRAPHY AD -- FILTERLINE

## (undated) DEVICE — SURGICAL PROCEDURE KIT GEN LAPAROSCOPY LF

## (undated) DEVICE — BW-412T DISP COMBO CLEANING BRUSH: Brand: SINGLE USE COMBINATION CLEANING BRUSH

## (undated) DEVICE — DRAPE XR C ARM 41X74IN LF --

## (undated) DEVICE — KENDALL SCD EXPRESS SLEEVES, KNEE LENGTH, MEDIUM: Brand: KENDALL SCD